# Patient Record
Sex: MALE | Race: WHITE | NOT HISPANIC OR LATINO | Employment: OTHER | URBAN - METROPOLITAN AREA
[De-identification: names, ages, dates, MRNs, and addresses within clinical notes are randomized per-mention and may not be internally consistent; named-entity substitution may affect disease eponyms.]

---

## 2018-05-14 ENCOUNTER — HOSPITAL ENCOUNTER (INPATIENT)
Facility: HOSPITAL | Age: 58
LOS: 5 days | Discharge: HOME | DRG: 190 | End: 2018-05-19
Attending: EMERGENCY MEDICINE | Admitting: HOSPITALIST
Payer: MEDICARE

## 2018-05-14 DIAGNOSIS — J44.9 COPD (CHRONIC OBSTRUCTIVE PULMONARY DISEASE): ICD-10-CM

## 2018-05-14 DIAGNOSIS — N28.9 RENAL INSUFFICIENCY: Primary | ICD-10-CM

## 2018-05-14 DIAGNOSIS — R06.02 SOB (SHORTNESS OF BREATH): ICD-10-CM

## 2018-05-14 LAB
ALBUMIN SERPL BCP-MCNC: 3 G/DL
ALP SERPL-CCNC: 209 U/L
ALT SERPL W/O P-5'-P-CCNC: 67 U/L
ANION GAP SERPL CALC-SCNC: 16 MMOL/L
AST SERPL-CCNC: 55 U/L
B-OH-BUTYR BLD STRIP-SCNC: 1 MMOL/L
BACTERIA #/AREA URNS HPF: ABNORMAL /HPF
BASOPHILS # BLD AUTO: 0.03 K/UL
BASOPHILS NFR BLD: 0.3 %
BILIRUB SERPL-MCNC: 0.6 MG/DL
BILIRUB UR QL STRIP: NEGATIVE
BUN SERPL-MCNC: 27 MG/DL
CALCIUM SERPL-MCNC: 7.9 MG/DL
CHLORIDE SERPL-SCNC: 98 MMOL/L
CLARITY UR: CLEAR
CO2 SERPL-SCNC: 21 MMOL/L
COLOR UR: YELLOW
CREAT SERPL-MCNC: 2 MG/DL
DIFFERENTIAL METHOD: ABNORMAL
EOSINOPHIL # BLD AUTO: 0.2 K/UL
EOSINOPHIL NFR BLD: 2.1 %
ERYTHROCYTE [DISTWIDTH] IN BLOOD BY AUTOMATED COUNT: 16.2 %
EST. GFR  (AFRICAN AMERICAN): 42 ML/MIN/1.73 M^2
EST. GFR  (NON AFRICAN AMERICAN): 36 ML/MIN/1.73 M^2
GLUCOSE SERPL-MCNC: 545 MG/DL
GLUCOSE UR QL STRIP: ABNORMAL
GRAN CASTS #/AREA URNS LPF: 2 /LPF
HCT VFR BLD AUTO: 33.5 %
HGB BLD-MCNC: 11 G/DL
HGB UR QL STRIP: ABNORMAL
HYALINE CASTS #/AREA URNS LPF: 2 /LPF
KETONES UR QL STRIP: ABNORMAL
LACTATE SERPL-SCNC: 3.8 MMOL/L
LEUKOCYTE ESTERASE UR QL STRIP: NEGATIVE
LYMPHOCYTES # BLD AUTO: 1.4 K/UL
LYMPHOCYTES NFR BLD: 15.1 %
MCH RBC QN AUTO: 28.6 PG
MCHC RBC AUTO-ENTMCNC: 32.8 G/DL
MCV RBC AUTO: 87 FL
MICROSCOPIC COMMENT: ABNORMAL
MONOCYTES # BLD AUTO: 0.7 K/UL
MONOCYTES NFR BLD: 7.6 %
NEUTROPHILS # BLD AUTO: 6.9 K/UL
NEUTROPHILS NFR BLD: 73.7 %
NITRITE UR QL STRIP: NEGATIVE
PH UR STRIP: 5 [PH] (ref 5–8)
PLATELET # BLD AUTO: 326 K/UL
PMV BLD AUTO: 9.4 FL
POCT GLUCOSE: 405 MG/DL (ref 70–110)
POTASSIUM SERPL-SCNC: 4.4 MMOL/L
PROT SERPL-MCNC: 6.2 G/DL
PROT UR QL STRIP: NEGATIVE
RBC # BLD AUTO: 3.84 M/UL
RBC #/AREA URNS HPF: 3 /HPF (ref 0–4)
SODIUM SERPL-SCNC: 135 MMOL/L
SP GR UR STRIP: 1.02 (ref 1–1.03)
URN SPEC COLLECT METH UR: ABNORMAL
UROBILINOGEN UR STRIP-ACNC: NEGATIVE EU/DL
WBC # BLD AUTO: 9.31 K/UL
YEAST URNS QL MICRO: ABNORMAL

## 2018-05-14 PROCEDURE — 85025 COMPLETE CBC W/AUTO DIFF WBC: CPT

## 2018-05-14 PROCEDURE — 83605 ASSAY OF LACTIC ACID: CPT

## 2018-05-14 PROCEDURE — 82962 GLUCOSE BLOOD TEST: CPT

## 2018-05-14 PROCEDURE — 99285 EMERGENCY DEPT VISIT HI MDM: CPT

## 2018-05-14 PROCEDURE — 96365 THER/PROPH/DIAG IV INF INIT: CPT

## 2018-05-14 PROCEDURE — 25000242 PHARM REV CODE 250 ALT 637 W/ HCPCS: Performed by: EMERGENCY MEDICINE

## 2018-05-14 PROCEDURE — 27000221 HC OXYGEN, UP TO 24 HOURS

## 2018-05-14 PROCEDURE — 94640 AIRWAY INHALATION TREATMENT: CPT

## 2018-05-14 PROCEDURE — 25000003 PHARM REV CODE 250: Performed by: PHYSICIAN ASSISTANT

## 2018-05-14 PROCEDURE — 87040 BLOOD CULTURE FOR BACTERIA: CPT

## 2018-05-14 PROCEDURE — 96376 TX/PRO/DX INJ SAME DRUG ADON: CPT

## 2018-05-14 PROCEDURE — 82010 KETONE BODYS QUAN: CPT

## 2018-05-14 PROCEDURE — 93010 ELECTROCARDIOGRAM REPORT: CPT | Mod: ,,, | Performed by: INTERNAL MEDICINE

## 2018-05-14 PROCEDURE — 96367 TX/PROPH/DG ADDL SEQ IV INF: CPT

## 2018-05-14 PROCEDURE — 96374 THER/PROPH/DIAG INJ IV PUSH: CPT | Mod: 59

## 2018-05-14 PROCEDURE — 96375 TX/PRO/DX INJ NEW DRUG ADDON: CPT

## 2018-05-14 PROCEDURE — 80053 COMPREHEN METABOLIC PANEL: CPT

## 2018-05-14 PROCEDURE — 12000002 HC ACUTE/MED SURGE SEMI-PRIVATE ROOM

## 2018-05-14 PROCEDURE — 81000 URINALYSIS NONAUTO W/SCOPE: CPT

## 2018-05-14 RX ORDER — CEFTRIAXONE 2 G/50ML
2 INJECTION, SOLUTION INTRAVENOUS
Status: COMPLETED | OUTPATIENT
Start: 2018-05-14 | End: 2018-05-15

## 2018-05-14 RX ORDER — IPRATROPIUM BROMIDE AND ALBUTEROL SULFATE 2.5; .5 MG/3ML; MG/3ML
3 SOLUTION RESPIRATORY (INHALATION)
Status: COMPLETED | OUTPATIENT
Start: 2018-05-14 | End: 2018-05-14

## 2018-05-14 RX ORDER — IPRATROPIUM BROMIDE AND ALBUTEROL SULFATE 2.5; .5 MG/3ML; MG/3ML
3 SOLUTION RESPIRATORY (INHALATION)
Status: COMPLETED | OUTPATIENT
Start: 2018-05-14 | End: 2018-05-15

## 2018-05-14 RX ADMIN — IPRATROPIUM BROMIDE AND ALBUTEROL SULFATE 3 ML: .5; 2.5 SOLUTION RESPIRATORY (INHALATION) at 10:05

## 2018-05-14 RX ADMIN — SODIUM CHLORIDE 1000 ML: 0.9 INJECTION, SOLUTION INTRAVENOUS at 10:05

## 2018-05-15 PROBLEM — J96.02 ACUTE RESPIRATORY FAILURE WITH HYPOXIA AND HYPERCAPNIA: Status: ACTIVE | Noted: 2018-05-15

## 2018-05-15 PROBLEM — E11.65 TYPE 2 DIABETES MELLITUS WITH HYPERGLYCEMIA, WITHOUT LONG-TERM CURRENT USE OF INSULIN: Status: ACTIVE | Noted: 2018-05-15

## 2018-05-15 PROBLEM — J96.01 ACUTE RESPIRATORY FAILURE WITH HYPOXIA AND HYPERCAPNIA: Status: ACTIVE | Noted: 2018-05-15

## 2018-05-15 PROBLEM — N28.9 RENAL INSUFFICIENCY: Status: ACTIVE | Noted: 2018-05-15

## 2018-05-15 PROBLEM — E44.0 MALNUTRITION OF MODERATE DEGREE: Status: ACTIVE | Noted: 2018-05-15

## 2018-05-15 PROBLEM — R25.2 LEG CRAMPING: Status: ACTIVE | Noted: 2018-05-15

## 2018-05-15 PROBLEM — D64.9 ANEMIA: Status: ACTIVE | Noted: 2018-05-15

## 2018-05-15 PROBLEM — J44.1 COPD EXACERBATION: Status: ACTIVE | Noted: 2018-05-15

## 2018-05-15 PROBLEM — R06.02 SOB (SHORTNESS OF BREATH): Status: ACTIVE | Noted: 2018-05-15

## 2018-05-15 PROBLEM — N17.9 AKI (ACUTE KIDNEY INJURY): Status: ACTIVE | Noted: 2018-05-15

## 2018-05-15 LAB
ALLENS TEST: ABNORMAL
ANION GAP SERPL CALC-SCNC: 15 MMOL/L
BUN SERPL-MCNC: 21 MG/DL
CALCIUM SERPL-MCNC: 7.7 MG/DL
CHLORIDE SERPL-SCNC: 107 MMOL/L
CO2 SERPL-SCNC: 20 MMOL/L
CREAT SERPL-MCNC: 1.5 MG/DL
DELSYS: ABNORMAL
EP: 5
ERYTHROCYTE [SEDIMENTATION RATE] IN BLOOD BY WESTERGREN METHOD: 12 MM/H
ERYTHROCYTE [SEDIMENTATION RATE] IN BLOOD BY WESTERGREN METHOD: 2 MM/H
EST. GFR  (AFRICAN AMERICAN): 60 ML/MIN/1.73 M^2
EST. GFR  (NON AFRICAN AMERICAN): 52 ML/MIN/1.73 M^2
ESTIMATED AVG GLUCOSE: 272 MG/DL
FIO2: 100
FIO2: 50
GLUCOSE SERPL-MCNC: 242 MG/DL
HBA1C MFR BLD HPLC: 11.1 %
HCO3 UR-SCNC: 18.8 MMOL/L (ref 24–28)
HCO3 UR-SCNC: 20.9 MMOL/L (ref 24–28)
HCO3 UR-SCNC: 29.6 MMOL/L (ref 24–28)
IP: 10
LACTATE SERPL-SCNC: 6.4 MMOL/L
MAGNESIUM SERPL-MCNC: 1.6 MG/DL
MODE: ABNORMAL
PCO2 BLDA: 32.7 MMHG (ref 35–45)
PCO2 BLDA: 44.1 MMHG (ref 35–45)
PCO2 BLDA: 52.1 MMHG (ref 35–45)
PH SMN: 7.21 [PH] (ref 7.35–7.45)
PH SMN: 7.37 [PH] (ref 7.35–7.45)
PH SMN: 7.43 [PH] (ref 7.35–7.45)
PO2 BLDA: 110 MMHG (ref 80–100)
PO2 BLDA: 227 MMHG (ref 80–100)
PO2 BLDA: 607 MMHG (ref 80–100)
POC BE: -6 MMOL/L
POC BE: -7 MMOL/L
POC BE: 5 MMOL/L
POC SATURATED O2: 100 % (ref 95–100)
POC SATURATED O2: 100 % (ref 95–100)
POC SATURATED O2: 98 % (ref 95–100)
POC TCO2: 20 MMOL/L (ref 23–27)
POC TCO2: 22 MMOL/L (ref 23–27)
POC TCO2: 31 MMOL/L (ref 23–27)
POCT GLUCOSE: 124 MG/DL (ref 70–110)
POCT GLUCOSE: 135 MG/DL (ref 70–110)
POCT GLUCOSE: 210 MG/DL (ref 70–110)
POCT GLUCOSE: 330 MG/DL (ref 70–110)
POCT GLUCOSE: 346 MG/DL (ref 70–110)
POCT GLUCOSE: 363 MG/DL (ref 70–110)
POCT GLUCOSE: 383 MG/DL (ref 70–110)
POCT GLUCOSE: 434 MG/DL (ref 70–110)
POCT GLUCOSE: 474 MG/DL (ref 70–110)
POTASSIUM SERPL-SCNC: 4 MMOL/L
SAMPLE: ABNORMAL
SITE: ABNORMAL
SODIUM SERPL-SCNC: 142 MMOL/L
SP02: 100

## 2018-05-15 PROCEDURE — 94660 CPAP INITIATION&MGMT: CPT

## 2018-05-15 PROCEDURE — 94644 CONT INHLJ TX 1ST HOUR: CPT

## 2018-05-15 PROCEDURE — G8979 MOBILITY GOAL STATUS: HCPCS | Mod: CH

## 2018-05-15 PROCEDURE — 82803 BLOOD GASES ANY COMBINATION: CPT

## 2018-05-15 PROCEDURE — 25000242 PHARM REV CODE 250 ALT 637 W/ HCPCS: Performed by: HOSPITALIST

## 2018-05-15 PROCEDURE — 99900035 HC TECH TIME PER 15 MIN (STAT)

## 2018-05-15 PROCEDURE — 80048 BASIC METABOLIC PNL TOTAL CA: CPT

## 2018-05-15 PROCEDURE — G8978 MOBILITY CURRENT STATUS: HCPCS | Mod: CH

## 2018-05-15 PROCEDURE — G8980 MOBILITY D/C STATUS: HCPCS | Mod: CH

## 2018-05-15 PROCEDURE — 25000003 PHARM REV CODE 250: Performed by: EMERGENCY MEDICINE

## 2018-05-15 PROCEDURE — 83036 HEMOGLOBIN GLYCOSYLATED A1C: CPT

## 2018-05-15 PROCEDURE — 63600175 PHARM REV CODE 636 W HCPCS: Performed by: HOSPITALIST

## 2018-05-15 PROCEDURE — 94640 AIRWAY INHALATION TREATMENT: CPT

## 2018-05-15 PROCEDURE — 63600175 PHARM REV CODE 636 W HCPCS: Performed by: EMERGENCY MEDICINE

## 2018-05-15 PROCEDURE — 25000003 PHARM REV CODE 250: Performed by: HOSPITALIST

## 2018-05-15 PROCEDURE — 21400001 HC TELEMETRY ROOM

## 2018-05-15 PROCEDURE — 25000242 PHARM REV CODE 250 ALT 637 W/ HCPCS: Performed by: EMERGENCY MEDICINE

## 2018-05-15 PROCEDURE — 83735 ASSAY OF MAGNESIUM: CPT

## 2018-05-15 PROCEDURE — 27100107 HC POCKET PEAK FLOW METER

## 2018-05-15 PROCEDURE — 94761 N-INVAS EAR/PLS OXIMETRY MLT: CPT

## 2018-05-15 PROCEDURE — 36600 WITHDRAWAL OF ARTERIAL BLOOD: CPT

## 2018-05-15 PROCEDURE — 83605 ASSAY OF LACTIC ACID: CPT

## 2018-05-15 PROCEDURE — 97161 PT EVAL LOW COMPLEX 20 MIN: CPT

## 2018-05-15 RX ORDER — IBUPROFEN 200 MG
24 TABLET ORAL
Status: DISCONTINUED | OUTPATIENT
Start: 2018-05-15 | End: 2018-05-19 | Stop reason: HOSPADM

## 2018-05-15 RX ORDER — ETOMIDATE 2 MG/ML
INJECTION INTRAVENOUS
Status: DISCONTINUED
Start: 2018-05-15 | End: 2018-05-15 | Stop reason: WASHOUT

## 2018-05-15 RX ORDER — FLUTICASONE FUROATE AND VILANTEROL 100; 25 UG/1; UG/1
1 POWDER RESPIRATORY (INHALATION) DAILY
Status: DISCONTINUED | OUTPATIENT
Start: 2018-05-15 | End: 2018-05-19 | Stop reason: HOSPADM

## 2018-05-15 RX ORDER — CYPROHEPTADINE HYDROCHLORIDE 4 MG/1
4 TABLET ORAL 3 TIMES DAILY
Status: DISCONTINUED | OUTPATIENT
Start: 2018-05-15 | End: 2018-05-19 | Stop reason: HOSPADM

## 2018-05-15 RX ORDER — ACETAMINOPHEN 325 MG/1
650 TABLET ORAL EVERY 6 HOURS PRN
Status: DISCONTINUED | OUTPATIENT
Start: 2018-05-15 | End: 2018-05-19 | Stop reason: HOSPADM

## 2018-05-15 RX ORDER — METHYLPREDNISOLONE SOD SUCC 125 MG
80 VIAL (EA) INJECTION EVERY 8 HOURS
Status: DISCONTINUED | OUTPATIENT
Start: 2018-05-15 | End: 2018-05-16

## 2018-05-15 RX ORDER — ETOMIDATE 2 MG/ML
20 INJECTION INTRAVENOUS
Status: DISCONTINUED | OUTPATIENT
Start: 2018-05-15 | End: 2018-05-15

## 2018-05-15 RX ORDER — ROCURONIUM BROMIDE 10 MG/ML
INJECTION, SOLUTION INTRAVENOUS
Status: DISCONTINUED
Start: 2018-05-15 | End: 2018-05-15 | Stop reason: WASHOUT

## 2018-05-15 RX ORDER — ALBUTEROL SULFATE 2.5 MG/.5ML
5 SOLUTION RESPIRATORY (INHALATION)
Status: COMPLETED | OUTPATIENT
Start: 2018-05-15 | End: 2018-05-15

## 2018-05-15 RX ORDER — INSULIN ASPART 100 [IU]/ML
0-5 INJECTION, SOLUTION INTRAVENOUS; SUBCUTANEOUS
Status: DISCONTINUED | OUTPATIENT
Start: 2018-05-15 | End: 2018-05-19 | Stop reason: HOSPADM

## 2018-05-15 RX ORDER — IBUPROFEN 200 MG
16 TABLET ORAL
Status: DISCONTINUED | OUTPATIENT
Start: 2018-05-15 | End: 2018-05-19 | Stop reason: HOSPADM

## 2018-05-15 RX ORDER — INSULIN ASPART 100 [IU]/ML
4 INJECTION, SOLUTION INTRAVENOUS; SUBCUTANEOUS
Status: DISCONTINUED | OUTPATIENT
Start: 2018-05-15 | End: 2018-05-15

## 2018-05-15 RX ORDER — GLUCAGON 1 MG
1 KIT INJECTION
Status: DISCONTINUED | OUTPATIENT
Start: 2018-05-15 | End: 2018-05-19 | Stop reason: HOSPADM

## 2018-05-15 RX ORDER — ETOMIDATE 2 MG/ML
INJECTION INTRAVENOUS
Status: DISPENSED
Start: 2018-05-15 | End: 2018-05-15

## 2018-05-15 RX ORDER — ALBUTEROL SULFATE 2.5 MG/.5ML
15 SOLUTION RESPIRATORY (INHALATION)
Status: COMPLETED | OUTPATIENT
Start: 2018-05-15 | End: 2018-05-15

## 2018-05-15 RX ORDER — LORAZEPAM 0.5 MG/1
1 TABLET ORAL
Status: DISCONTINUED | OUTPATIENT
Start: 2018-05-15 | End: 2018-05-15

## 2018-05-15 RX ORDER — NITROGLYCERIN 0.4 MG/1
0.4 TABLET SUBLINGUAL
Status: COMPLETED | OUTPATIENT
Start: 2018-05-15 | End: 2018-05-15

## 2018-05-15 RX ORDER — INSULIN ASPART 100 [IU]/ML
3 INJECTION, SOLUTION INTRAVENOUS; SUBCUTANEOUS
Status: DISCONTINUED | OUTPATIENT
Start: 2018-05-15 | End: 2018-05-17

## 2018-05-15 RX ORDER — MAGNESIUM SULFATE HEPTAHYDRATE 40 MG/ML
2 INJECTION, SOLUTION INTRAVENOUS ONCE
Status: COMPLETED | OUTPATIENT
Start: 2018-05-15 | End: 2018-05-15

## 2018-05-15 RX ORDER — HYDROXYZINE HYDROCHLORIDE 25 MG/1
25 TABLET, FILM COATED ORAL ONCE
Status: COMPLETED | OUTPATIENT
Start: 2018-05-15 | End: 2018-05-15

## 2018-05-15 RX ORDER — FLUTICASONE PROPIONATE 50 MCG
1 SPRAY, SUSPENSION (ML) NASAL DAILY
Status: DISCONTINUED | OUTPATIENT
Start: 2018-05-15 | End: 2018-05-16

## 2018-05-15 RX ORDER — LORAZEPAM 2 MG/ML
1 INJECTION INTRAMUSCULAR
Status: COMPLETED | OUTPATIENT
Start: 2018-05-15 | End: 2018-05-15

## 2018-05-15 RX ORDER — ROCURONIUM BROMIDE 10 MG/ML
INJECTION, SOLUTION INTRAVENOUS
Status: DISPENSED
Start: 2018-05-15 | End: 2018-05-15

## 2018-05-15 RX ORDER — SUCCINYLCHOLINE CHLORIDE 20 MG/ML
INJECTION INTRAMUSCULAR; INTRAVENOUS
Status: DISCONTINUED
Start: 2018-05-15 | End: 2018-05-15 | Stop reason: WASHOUT

## 2018-05-15 RX ORDER — DIPHENHYDRAMINE HYDROCHLORIDE 50 MG/ML
25 INJECTION INTRAMUSCULAR; INTRAVENOUS
Status: COMPLETED | OUTPATIENT
Start: 2018-05-15 | End: 2018-05-15

## 2018-05-15 RX ORDER — INSULIN ASPART 100 [IU]/ML
10 INJECTION, SOLUTION INTRAVENOUS; SUBCUTANEOUS ONCE
Status: COMPLETED | OUTPATIENT
Start: 2018-05-15 | End: 2018-05-15

## 2018-05-15 RX ORDER — HYDROXYZINE PAMOATE 25 MG/1
25 CAPSULE ORAL NIGHTLY PRN
Status: DISCONTINUED | OUTPATIENT
Start: 2018-05-15 | End: 2018-05-19 | Stop reason: HOSPADM

## 2018-05-15 RX ORDER — ROCURONIUM BROMIDE 10 MG/ML
0.1 INJECTION, SOLUTION INTRAVENOUS ONCE
Status: DISCONTINUED | OUTPATIENT
Start: 2018-05-15 | End: 2018-05-15

## 2018-05-15 RX ORDER — IPRATROPIUM BROMIDE AND ALBUTEROL SULFATE 2.5; .5 MG/3ML; MG/3ML
3 SOLUTION RESPIRATORY (INHALATION) EVERY 4 HOURS
Status: DISCONTINUED | OUTPATIENT
Start: 2018-05-15 | End: 2018-05-16

## 2018-05-15 RX ORDER — FUROSEMIDE 10 MG/ML
40 INJECTION INTRAMUSCULAR; INTRAVENOUS
Status: COMPLETED | OUTPATIENT
Start: 2018-05-15 | End: 2018-05-15

## 2018-05-15 RX ADMIN — SODIUM CHLORIDE 1524 ML: 0.9 INJECTION, SOLUTION INTRAVENOUS at 12:05

## 2018-05-15 RX ADMIN — IPRATROPIUM BROMIDE AND ALBUTEROL SULFATE 3 ML: .5; 2.5 SOLUTION RESPIRATORY (INHALATION) at 08:05

## 2018-05-15 RX ADMIN — INSULIN DETEMIR 8 UNITS: 100 INJECTION, SOLUTION SUBCUTANEOUS at 08:05

## 2018-05-15 RX ADMIN — INSULIN HUMAN 5 UNITS: 100 INJECTION, SOLUTION PARENTERAL at 12:05

## 2018-05-15 RX ADMIN — FUROSEMIDE 40 MG: 10 INJECTION, SOLUTION INTRAMUSCULAR; INTRAVENOUS at 01:05

## 2018-05-15 RX ADMIN — INSULIN ASPART 4 UNITS: 100 INJECTION, SOLUTION INTRAVENOUS; SUBCUTANEOUS at 11:05

## 2018-05-15 RX ADMIN — LORAZEPAM 1 MG: 2 INJECTION, SOLUTION INTRAMUSCULAR; INTRAVENOUS at 04:05

## 2018-05-15 RX ADMIN — AZITHROMYCIN MONOHYDRATE 500 MG: 500 INJECTION, POWDER, LYOPHILIZED, FOR SOLUTION INTRAVENOUS at 12:05

## 2018-05-15 RX ADMIN — INSULIN HUMAN 5 UNITS: 100 INJECTION, SOLUTION PARENTERAL at 04:05

## 2018-05-15 RX ADMIN — IPRATROPIUM BROMIDE AND ALBUTEROL SULFATE 3 ML: .5; 2.5 SOLUTION RESPIRATORY (INHALATION) at 11:05

## 2018-05-15 RX ADMIN — INSULIN ASPART 4 UNITS: 100 INJECTION, SOLUTION INTRAVENOUS; SUBCUTANEOUS at 04:05

## 2018-05-15 RX ADMIN — CEFTRIAXONE 2 G: 2 INJECTION, SOLUTION INTRAVENOUS at 03:05

## 2018-05-15 RX ADMIN — HYDROXYZINE HYDROCHLORIDE 25 MG: 25 TABLET, FILM COATED ORAL at 12:05

## 2018-05-15 RX ADMIN — IPRATROPIUM BROMIDE AND ALBUTEROL SULFATE 3 ML: .5; 2.5 SOLUTION RESPIRATORY (INHALATION) at 07:05

## 2018-05-15 RX ADMIN — ALBUTEROL SULFATE 15 MG: 2.5 SOLUTION RESPIRATORY (INHALATION) at 05:05

## 2018-05-15 RX ADMIN — ALBUTEROL SULFATE 5 MG: 2.5 SOLUTION RESPIRATORY (INHALATION) at 02:05

## 2018-05-15 RX ADMIN — CYPROHEPTADINE HYDROCHLORIDE 4 MG: 4 TABLET ORAL at 02:05

## 2018-05-15 RX ADMIN — INSULIN ASPART 3 UNITS: 100 INJECTION, SOLUTION INTRAVENOUS; SUBCUTANEOUS at 04:05

## 2018-05-15 RX ADMIN — FLUTICASONE PROPIONATE 50 MCG: 50 SPRAY, METERED NASAL at 10:05

## 2018-05-15 RX ADMIN — NITROGLYCERIN 0.4 MG: 0.4 TABLET SUBLINGUAL at 01:05

## 2018-05-15 RX ADMIN — METHYLPREDNISOLONE SODIUM SUCCINATE 80 MG: 125 INJECTION, POWDER, FOR SOLUTION INTRAMUSCULAR; INTRAVENOUS at 07:05

## 2018-05-15 RX ADMIN — INSULIN ASPART 3 UNITS: 100 INJECTION, SOLUTION INTRAVENOUS; SUBCUTANEOUS at 12:05

## 2018-05-15 RX ADMIN — FLUTICASONE FUROATE AND VILANTEROL TRIFENATATE 1 PUFF: 100; 25 POWDER RESPIRATORY (INHALATION) at 11:05

## 2018-05-15 RX ADMIN — CYPROHEPTADINE HYDROCHLORIDE 4 MG: 4 TABLET ORAL at 09:05

## 2018-05-15 RX ADMIN — IPRATROPIUM BROMIDE AND ALBUTEROL SULFATE 3 ML: .5; 2.5 SOLUTION RESPIRATORY (INHALATION) at 12:05

## 2018-05-15 RX ADMIN — METHYLPREDNISOLONE SODIUM SUCCINATE 80 MG: 125 INJECTION, POWDER, FOR SOLUTION INTRAMUSCULAR; INTRAVENOUS at 09:05

## 2018-05-15 RX ADMIN — INSULIN ASPART 10 UNITS: 100 INJECTION, SOLUTION INTRAVENOUS; SUBCUTANEOUS at 10:05

## 2018-05-15 RX ADMIN — MAGNESIUM SULFATE IN WATER 2 G: 40 INJECTION, SOLUTION INTRAVENOUS at 12:05

## 2018-05-15 RX ADMIN — SODIUM CHLORIDE 500 ML: 0.9 INJECTION, SOLUTION INTRAVENOUS at 02:05

## 2018-05-15 RX ADMIN — METHYLPREDNISOLONE SODIUM SUCCINATE 80 MG: 125 INJECTION, POWDER, FOR SOLUTION INTRAMUSCULAR; INTRAVENOUS at 01:05

## 2018-05-15 RX ADMIN — DIPHENHYDRAMINE HYDROCHLORIDE 25 MG: 50 INJECTION, SOLUTION INTRAMUSCULAR; INTRAVENOUS at 05:05

## 2018-05-15 NOTE — PLAN OF CARE
Problem: Patient Care Overview  Goal: Plan of Care Review  Outcome: Ongoing (interventions implemented as appropriate)  Patient admitted to ICU overnight for COPD exacerbation. Patient tolerated 2LNC majority of the day and is now 96% on room air. AAOx3. Vitals stable. ST on cardiac monitor. Denies shortness of breath and chest pain. Voids spontaneously. 500ml NS bolus given. Transferred to Telemetry with RN and transporter with patient belongings. Denies complaints. Oriented to new room. Bedside report given with KAYE Easley.

## 2018-05-15 NOTE — ASSESSMENT & PLAN NOTE
"- presented with Cr 2  - unknown baseline, has diagnosis of "renal insufficiency" but unknown CKD stage. Last Cr in system is 1  - improved to 1.5 with IVF, suggesting prerenal etiology  - continue to monitor  - renal dose all meds, avoid nephrotoxins     "

## 2018-05-15 NOTE — ASSESSMENT & PLAN NOTE
- as evidenced by loss of subQ fat and muscle wasting as well as falls at home. Patient also on cyproheptadine as outpatient   - increase diet to 2000 samm

## 2018-05-15 NOTE — PLAN OF CARE
Mr. Basilio Márquez is a 57 y.o. man with COPD, history of Hodgkin's lymphoma in his 20s, DM who presented with COPD exacerbation. Received steroids, nebs, antibiotics in ED. Admitted to ICU requiring continuous BiPAP. Weaned easily to NC 5/15 AM. Now on 1L NC with SpO2 upper 90s. Still wheezing. Step down to floor for COPD optimization. Note patient has chronic lung changes with R sided scarring due to Hodgkin's lymphoma.     To do  - wean O2  - PT/OT eval- patient with recent falls at home    Vidya Calle MD  [facility]  05/15/2018 1:41 PM

## 2018-05-15 NOTE — ED PROVIDER NOTES
Encounter Date: 5/14/2018    SCRIBE #1 NOTE: I, Ileana George, am scribing for, and in the presence of,  Azar Durham MD. I have scribed the following portions of the note - Other sections scribed: HPI, ROS, PE.       History     Chief Complaint   Patient presents with    Shortness of Breath     since this morning     Hyperglycemia     cbg of 473, hx of diabetes, increased thrist and urination      CC: Shortness of Breath    HPI: This 57 y.o make who reports of asthma and COPD presents to the ED for an evaluation of acute, constant, severe shortness of breath for the past several days.  Patient also reports of associated productive cough and fever.  Patient reports for the past 5 years he has been having increased episodes of shortness of breath.  Patient reports the use of a home nebulizer.  Patient reports being evaluated at [facility] 1 week ago for similar symptoms and reports being placed on oral steroids, which he completed 2-3 days ago.  Patient denies nausea, emesis, diarrhea, abdominal pain, chest pain, back pain, leg swelling, or any other associated symptoms. No alleviating factors.      The history is provided by the patient. No  was used.     Review of patient's allergies indicates:  No Known Allergies  No past medical history on file.  No past surgical history on file.  No family history on file.  Social History   Substance Use Topics    Smoking status: Current Every Day Smoker    Smokeless tobacco: Not on file    Alcohol use No     Review of Systems   Constitutional: Positive for fever. Negative for chills.   HENT: Negative for ear pain and sore throat.    Eyes: Negative for pain.   Respiratory: Positive for cough and shortness of breath.    Cardiovascular: Negative for chest pain.   Gastrointestinal: Negative for abdominal pain, diarrhea, nausea and vomiting.   Genitourinary: Negative for dysuria.   Musculoskeletal: Negative for back pain.   Skin: Negative for rash.    Neurological: Negative for headaches.       Physical Exam     Initial Vitals [05/14/18 2114]   BP Pulse Resp Temp SpO2   134/85 (!) 130 20 98.1 °F (36.7 °C) 99 %      MAP       101.33         Physical Exam    Nursing note and vitals reviewed.  Constitutional: Vital signs are normal. He appears well-developed and well-nourished. He is active.  Non-toxic appearance. No distress.   HENT:   Head: Normocephalic and atraumatic.   Mouth/Throat: Dental caries (nacrotic) present.   Eyes: EOM are normal.   Neck: Trachea normal. Neck supple.   Cardiovascular: Normal rate and regular rhythm.   Pulmonary/Chest: No respiratory distress.   Patient has bilateral course wheezes.    Abdominal: Soft. Normal appearance and bowel sounds are normal. He exhibits no distension. There is no tenderness.   Musculoskeletal: Normal range of motion. He exhibits no edema.   Neurological: He is alert.   Skin: Skin is warm, dry and intact.   Psychiatric: He has a normal mood and affect.         ED Course   Critical Care  Date/Time: 5/30/2018 9:13 AM  Performed by: YOLIS MIMS  Authorized by: RAFAELA COREA   Direct patient critical care time: 15 minutes  Ordering / reviewing critical care time: 5 minutes  Documentation critical care time: 5 minutes  Consulting other physicians critical care time: 5 minutes  Total critical care time (exclusive of procedural time) : 30 minutes  Critical care time was exclusive of separately billable procedures and treating other patients and teaching time.  Critical care was necessary to treat or prevent imminent or life-threatening deterioration of the following conditions: respiratory failure.  Critical care was time spent personally by me on the following activities: discussions with consultants, evaluation of patient's response to treatment, obtaining history from patient or surrogate, ordering and review of laboratory studies, pulse oximetry, re-evaluation of patient's condition, ordering and  review of radiographic studies, ordering and performing treatments and interventions, ventilator management, examination of patient and interpretation of cardiac output measurements.  Comments: bipap mgmt.         Labs Reviewed   CBC W/ AUTO DIFFERENTIAL   COMPREHENSIVE METABOLIC PANEL   URINALYSIS   BETA - HYDROXYBUTYRATE, SERUM     EKG Readings: (Independently Interpreted)   Initial Reading: No STEMI. Rhythm: Sinus Tachycardia. Heart Rate: 116. Ectopy: No Ectopy. Conduction: Normal. ST Segments: Normal ST Segments. T Waves: Normal. Axis: Normal. Clinical Impression: Normal Sinus Rhythm          Medical Decision Making:   Post 3 nebs still speaking in half sentences with dyspnea. Had recently been on steroids and just came off of them 2 days ago. ? How old cavitary lung lesion is. Pt states he had scarring before. With elevated cr, and fluid admin on possib of pneumonia, pt developed worsening dyspnea. Suspect some iatrogenic vol overload. Sx improved with ntg. Will diurese. Lungs still tight bilaterally with coarse wheezing.             Scribe Attestation:   Scribe #1: I performed the above scribed service and the documentation accurately describes the services I performed. I attest to the accuracy of the note.    Attending Attestation:           Physician Attestation for Scribe:  Physician Attestation Statement for Scribe #1: I, Azar Durham MD, reviewed documentation, as scribed by Ileana George in my presence, and it is both accurate and complete.                    Clinical Impression:   Diagnoses of SOB (shortness of breath) and SOB (shortness of breath) were pertinent to this visit.                           Azar Ramires MD  05/15/18 8505       Azar Ramires MD  05/30/18 9947

## 2018-05-15 NOTE — PROGRESS NOTES
Results for CORRINE PATTERSON (MRN [id number]) as of 5/15/2018 11:39   Ref. Range 5/15/2018 08:41   POC PH Latest Ref Range: 7.35 - 7.45  7.435   POC PCO2 Latest Ref Range: 35 - 45 mmHg 44.1   POC PO2 Latest Ref Range: 80 - 100 mmHg 607 (H)   POC BE Latest Ref Range: -2 to 2 mmol/L 5   POC HCO3 Latest Ref Range: 24 - 28 mmol/L 29.6 (H)   POC SATURATED O2 Latest Ref Range: 95 - 100 % 100   POC TCO2 Latest Ref Range: 23 - 27 mmol/L 31 (H)   FiO2 Unknown 100   Sample Unknown ARTERIAL   DelSys Unknown CPAP/BiPAP   Allens Test Unknown Pass   Site Unknown RR   Mode Unknown SPONT   Sp02 Unknown 100

## 2018-05-15 NOTE — PLAN OF CARE
Problem: Physical Therapy Goal  Goal: Physical Therapy Goal  Outcome: Outcome(s) achieved Date Met: 05/15/18  Pt OK for D/C home from PT standpoint.  No skilled PT or DME needs.

## 2018-05-15 NOTE — H&P
[facility]  History & Physical    Patient Name: Basilio Márquez  MRN: [id number]  Admission Date: 5/14/2018  Attending Physician: Vidya Calle MD   Primary Care Provider: To Obtain Unable         Patient information was obtained from patient, past medical records and ER records.     Subjective:     Principal Problem:<principal problem not specified>    Chief Complaint:   Chief Complaint   Patient presents with    Shortness of Breath     since this morning     Hyperglycemia     cbg of 473, hx of diabetes, increased thrist and urination         HPI: Mr. Basilio Márquez is a 57 y.o. 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 States this started gradually a few days ago with the weather change. Associated with chest congestion and cough with no sputum production. No fevers or chills. Chest pain only with coughing. No sick contacts. Tried his home inhalers and nebulizers with minimal improvement. Presented to ED for worsening shortness of breath.     Past Medical History:   Diagnosis Date    Asthma     Cancer     Hodgkin's lymphoma in his 20s s/p chemotherapy    COPD (chronic obstructive pulmonary disease)     Diabetes mellitus     GERD (gastroesophageal reflux disease)     Hypertension     Renal disorder        History reviewed. No pertinent surgical history.    Review of patient's allergies indicates:  No Known Allergies    No current facility-administered medications on file prior to encounter.      Current Outpatient Prescriptions on File Prior to Encounter   Medication Sig    albuterol 90 mcg/actuation inhaler Inhale 2 puffs into the lungs every 6 (six) hours as needed for Wheezing or Shortness of Breath.    budesonide-formoterol 160-4.5 mcg (SYMBICORT) 160-4.5 mcg/actuation HFAA Inhale 2 puffs into the lungs every 12 (twelve) hours.    cyproheptadine (PERIACTIN) 4 mg tablet Take 1 tablet (4 mg  total) by mouth 3 (three) times daily.    doxycycline (MONODOX) 100 MG capsule Take 1 capsule (100 mg total) by mouth 2 (two) times daily.    fluticasone (FLONASE) 50 mcg/actuation nasal spray 1-2 sprays by Each Nare route once daily.    hydrOXYzine pamoate (VISTARIL) 25 MG Cap Take 1 capsule (25 mg total) by mouth nightly as needed (insomnia or anxiety).    metformin (GLUCOPHAGE) 1000 MG tablet Take 1 tablet (1,000 mg total) by mouth 2 (two) times daily with meals.    promethazine-dextromethorphan (PROMETHAZINE-DM) 6.25-15 mg/5 mL Syrp 1 teaspoon PO Q 8 hrs PRN cough. DO NOT DRIVE AFTER TAKING MED     Family History     Problem Relation (Age of Onset)    Cancer Father    Ulcers Mother        Social History Main Topics    Smoking status: Former Smoker     Packs/day: 1.00     Years: 30.00     Types: Cigarettes    Smokeless tobacco: Never Used    Alcohol use 0.0 oz/week      Comment: 1/2 pint daily    Drug use: No    Sexual activity: Not on file     Review of Systems   Constitutional: Negative for appetite change, chills, diaphoresis, fatigue and fever.   HENT: Positive for congestion. Negative for postnasal drip, rhinorrhea, sinus pain, sinus pressure, sore throat and trouble swallowing.    Eyes: Negative for visual disturbance.   Respiratory: Positive for cough, shortness of breath and wheezing. Negative for choking, chest tightness and stridor.    Cardiovascular: Positive for chest pain. Negative for palpitations and leg swelling.   Gastrointestinal: Negative for abdominal distention, abdominal pain, constipation, diarrhea, nausea and vomiting.   Genitourinary: Negative for decreased urine volume, difficulty urinating, dysuria, hematuria and urgency.   Musculoskeletal: Positive for myalgias (leg cramping). Negative for arthralgias and back pain.   Skin: Positive for wound (scabs on legs from prior fall). Negative for pallor and rash.   Neurological: Negative for dizziness, syncope, facial asymmetry,  weakness, light-headedness and numbness.     Objective:     Vital Signs (Most Recent):  Temp: 98.1 °F (36.7 °C) (05/15/18 0645)  Pulse: 109 (05/15/18 0821)  Resp: 18 (05/15/18 0821)  BP: 137/79 (05/15/18 0645)  SpO2: 100 % (05/15/18 0821) Vital Signs (24h Range):  Temp:  [98.1 °F (36.7 °C)-98.6 °F (37 °C)] 98.1 °F (36.7 °C)  Pulse:  [109-130] 109  Resp:  [18-33] 18  SpO2:  [97 %-100 %] 100 %  BP: (121-155)/(66-85) 137/79     Weight: 47.2 kg (104 lb 0.9 oz)  Body mass index is 18.43 kg/m².    Physical Exam   Constitutional: He is oriented to person, place, and time. No distress.   Thin man   HENT:   Head: Normocephalic and atraumatic.   Nose: Nose normal.   Mouth/Throat: Oropharynx is clear and moist.   Eyes: Conjunctivae and EOM are normal. Pupils are equal, round, and reactive to light. No scleral icterus.   Neck: Neck supple. No JVD present. No tracheal deviation present. No thyromegaly present.   Cardiovascular: Intact distal pulses.  Exam reveals no gallop and no friction rub.    No murmur heard.  tachycardic   Pulmonary/Chest: No stridor. No respiratory distress. He has wheezes (throughout). He has no rales. He exhibits no tenderness.   On BiPAP. No accessory muscle use   Abdominal: Soft. Bowel sounds are normal. He exhibits no distension and no mass. There is no tenderness. There is no guarding.   Musculoskeletal: He exhibits no edema or tenderness.   Lymphadenopathy:     He has no cervical adenopathy.   Neurological: He is alert and oriented to person, place, and time. No cranial nerve deficit or sensory deficit.   Skin: Skin is warm and dry. He is not diaphoretic.   Scabs on bilateral lower extremities   Nursing note and vitals reviewed.        CRANIAL NERVES     CN III, IV, VI   Pupils are equal, round, and reactive to light.  Extraocular motions are normal.        Significant Labs: All pertinent labs within the past 24 hours have been reviewed.    Significant Imaging: I have reviewed and interpreted all  "pertinent imaging results/findings within the past 24 hours.    Assessment/Plan:     Malnutrition of moderate degree    - as evidenced by loss of subQ fat and muscle wasting as well as falls at home. Patient also on cyproheptadine as outpatient   - increase diet to 2000 samm          Leg cramping    - check Mg  - PRN APAP        Type 2 diabetes mellitus with hyperglycemia, without long-term current use of insulin    - unknown A1c. On metformin alone at home  - presented with hyperglycemia, improved rapidly with IVF  - check A1c  - ADA diet, accuchecks ACHS, SSI, hypoglycemic protocol  - detemir 8 QD          Anemia    - presents with Hgb 11, normal MCV  - likely anemia of chronic disease  - monitor           COPD exacerbation    - presented with hypoxic and hypercapnic respiratory failure. Patient has background of chronic lung changes due to Hodgkin's lymphoma with CXR showing scarring in R lung. Patient reports weather change as cause of acute exacerbation. Not on O2 at home  - improved with BiPAP  - now weaned to NC- continue to wean to SpO2 goal 88-92%  - BiPAP QHS  - continue nebs Q4. Patient still wheezing bilaterally  - continue solumedrol for now, may be able to wean to prednisone tomorrow pending clinical course  - patient reports that he takes incruse and advair at home. Start fluticasone-vilanterol now          DREW (acute kidney injury)    - presented with Cr 2  - unknown baseline, has diagnosis of "renal insufficiency" but unknown CKD stage. Last Cr in system is 1  - improved to 1.5 with IVF, suggesting prerenal etiology  - continue to monitor  - renal dose all meds, avoid nephrotoxins           Acute respiratory failure with hypoxia and hypercapnia    - secondary to COPD exacerbation  - improved with BiPAP  - weaned to NC  - see COPD exacerbation  - goal SpO2 88-92%            VTE Risk Mitigation         Ordered     IP VTE LOW RISK PATIENT  Once      05/15/18 0655     Place sequential compression device  " Until discontinued      05/15/18 0655        Critical care time spent on the evaluation and treatment of severe organ dysfunction, review of pertinent labs and imaging studies, discussions with consulting providers and discussions with patient/family: 60 minutes.     Vidya Calle MD  Department of Hospital Medicine   [facility]

## 2018-05-15 NOTE — ASSESSMENT & PLAN NOTE
- secondary to COPD exacerbation  - improved with BiPAP  - weaned to NC  - see COPD exacerbation  - goal SpO2 88-92%

## 2018-05-15 NOTE — PT/OT/SLP EVAL
Physical Therapy Evaluation    Patient Name:  Basilio Márquez   MRN:  [id number]    Recommendations:     Discharge Recommendations:  home   Discharge Equipment Recommendations: none   Barriers to discharge: None    Assessment:     Basilio Márquez is a 57 y.o. male admitted with a medical diagnosis of <principal problem not specified>.  He presents with the following impairments/functional limitations:   (N/A) .    Rehab Prognosis:  Good; patient would benefit from acute skilled PT services to address these deficits and reach maximum level of function.      Recent Surgery: * No surgery found *      Plan:     During this hospitalization, patient to be seen  (N/A) to address the above listed problems via  (N/A)  · Plan of Care Expires:  05/15/18   Plan of Care Reviewed with: patient    Subjective     Communicated with nsg prior to session.  Patient found supine I bed upon PT entry to room, agreeable to evaluation.      Chief Complaint: no c/o  Patient comments/goals: to go home  Pain/Comfort:  · Pain Rating 1: 0/10    Patients cultural, spiritual, Mormon conflicts given the current situation: none    Living Environment:  Ptlives alone in a ground floor apartment.  Prior to admission, patients level of function was Independent.  Patient has the following equipment: none.  DME owned (not currently used): none.  Upon discharge, patient will have assistance from ?.    Objective:     Patient found with: peripheral IV     General Precautions: Standard, fall   Orthopedic Precautions:N/A   Braces: N/A     Exams:  · Cognitive Exam:  Patient is oriented to Person, Place, Time and Situation and follows 100% of 1-step commands   · Gross Motor Coordination:  WFL  · Postural Exam:  Patient presented with the following abnormalities:    · -       Rounded shoulders  · -       Forward head  · Sensation:    · -       Intact  light/touch B UE/LE's  · Skin Integrity/Edema:      · -       Skin integrity: small amolunt of bloody drainage  at R hand IV site.  Nsg notified  · RUE ROM: WFL  · RUE Strength: WFL  · LUE ROM: WFL  · LUE Strength: WFL  · RLE ROM: WFL  · RLE Strength: WFL  · LLE ROM: WFL  · LLE Strength: WFL    Functional Mobility:  · Bed Mobility:     · Scooting: independence  · Supine to Sit: independence  · Sit to Supine: independence  · Transfers:     · Sit to Stand:  independence with no AD  · Gait: 250' independent  · Balance: Good    AM-PAC 6 CLICK MOBILITY  Total Score:24       Therapeutic Activities and Exercises:   eval only    Patient left supine with all lines intact, call button in reach, bed alarm on and nsg notified.    GOALS:    Physical Therapy Goals     Not on file          Multidisciplinary Problems (Resolved)        Problem: Physical Therapy Goal    Goal Priority Disciplines Outcome Goal Variances Interventions   Physical Therapy Goal   (Resolved)     PT/OT, PT Outcome(s) achieved                     History:     Past Medical History:   Diagnosis Date    Asthma     Cancer     Hodgkin's lymphoma in his 20s s/p chemotherapy    COPD (chronic obstructive pulmonary disease)     Diabetes mellitus     GERD (gastroesophageal reflux disease)     Hypertension     Renal disorder        History reviewed. No pertinent surgical history.    Clinical Decision Making:     History  Co-morbidities and personal factors that may impact the plan of care Examination  Body Structures and Functions, activity limitations and participation restrictions that may impact the plan of care Clinical Presentation   Decision Making/ Complexity Score   Co-morbidities:   [] Time since onset of injury / illness / exacerbation  [] Status of current condition  []Patient's cognitive status and safety concerns    [] Multiple Medical Problems (see med hx)  Personal Factors:   [] Patient's age  [] Prior Level of function   [] Patient's home situation (environment and family support)  [] Patient's level of motivation  [] Expected progression of  patient      HISTORY:(criteria)    [] 27151 - no personal factors/history    [] 68973 - has 1-2 personal factor/comorbidity     [] 08196 - has >3 personal factor/comorbidity     Body Regions:  [] Objective examination findings  [] Head     []  Neck  [] Trunk   [] Upper Extremity  [] Lower Extremity    Body Systems:  [] For communication ability, affect, cognition, language, and learning style: the assessment of the ability to make needs known, consciousness, orientation (person, place, and time), expected emotional /behavioral responses, and learning preferences (eg, learning barriers, education  needs)  [] For the neuromuscular system: a general assessment of gross coordinated movement (eg, balance, gait, locomotion, transfers, and transitions) and motor function  (motor control and motor learning)  [] For the musculoskeletal system: the assessment of gross symmetry, gross range of motion, gross strength, height, and weight  [] For the integumentary system: the assessment of pliability(texture), presence of scar formation, skin color, and skin integrity  [] For cardiovascular/pulmonary system: the assessment of heart rate, respiratory rate, blood pressure, and edema     Activity limitations:    [] Patient's cognitive status and saf ety concerns          [] Status of current condition      [] Weight bearing restriction  [] Cardiopulmunary Restriction    Participation Restrictions:   [] Goals and goal agreement with the patient     [] Rehab potential (prognosis) and probable outcome      Examination of Body System: (criteria)    [] 05981 - addressing 1-2 elements    [] 45456 - addressing a total of 3 or more elements     [] 52866 -  Addressing a total of 4 or more elements         Clinical Presentation: (criteria)  Choose one     On examination of body system using standardized tests and measures patient presents with (CHOOSE ONE) elements from any of the following: body structures and functions, activity  limitations, and/or participation restrictions.  Leading to a clinical presentation that is considered (CHOOSE ONE)                              Clinical Decision Making  (Eval Complexity):  Choose One     Time Tracking:     PT Received On: 05/15/18  PT Start Time: 1553     PT Stop Time: 1608  PT Total Time (min): 15 min     Billable Minutes: Evaluation 15      Karishma Huitron, PT  05/15/2018

## 2018-05-15 NOTE — HOSPITAL COURSE
Admitted to ICU with hypercapnic respiratory failure due to COPD exacerbation. Started on BiPAP, solumedrol, and nebs with improvement. Given ceftriaxone and azithromycin in ED; no signs of PNA, therefore discontinued. Patient later reported binge drinking often and non compliance with bronchodilators managements. Also exposed to second hand smoking. Weaned to nasal cannula on 5/15 AM and transferred to floor in stable condition. Completed 4 days of steroids. Caused significant rise in BG in setting of poorly controlled diabetes at home (HgbA1c 11.1%). Controlled with detemir 25 U QHS and novolog 8 U with meals. Discussed this regimen with patient as he was only doing novolog as needed at home. Is also to continue metformin. Eventually able to be weaned off completely from supplemental O2 and able to ambulate independently down hallways without respiratory issues. Strongly advised alcohol cessation and exposure to second hand smoke. He requested NH placement to help with medical management at home. Then decided on staying home and ok with outpatient case management. Declined home health. Is to f/u with PCP within next 7 days. Diabetic diet. Activity as tolerated.

## 2018-05-15 NOTE — ASSESSMENT & PLAN NOTE
- unknown A1c. On metformin alone at home  - presented with hyperglycemia, improved rapidly with IVF  - check A1c  - ADA diet, accuchecks ACHS, SSI, hypoglycemic protocol  - detemir 8 QD

## 2018-05-15 NOTE — ASSESSMENT & PLAN NOTE
- presented with hypoxic and hypercapnic respiratory failure. Patient has background of chronic lung changes due to Hodgkin's lymphoma with CXR showing scarring in R lung. Patient reports weather change as cause of acute exacerbation. Not on O2 at home  - improved with BiPAP  - now weaned to NC- continue to wean to SpO2 goal 88-92%  - BiPAP QHS  - continue nebs Q4. Patient still wheezing bilaterally  - continue solumedrol for now, may be able to wean to prednisone tomorrow pending clinical course  - patient reports that he takes incruse and advair at home. Start fluticasone-vilanterol now

## 2018-05-15 NOTE — HPI
Mr. Basilio Márquez is a 57 y.o. 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 States this started gradually a few days ago with the weather change. Associated with chest congestion and cough with no sputum production. No fevers or chills. Chest pain only with coughing. No sick contacts. Tried his home inhalers and nebulizers with minimal improvement. Presented to ED for worsening shortness of breath.

## 2018-05-15 NOTE — SUBJECTIVE & OBJECTIVE
Past Medical History:   Diagnosis Date    Asthma     Cancer     Hodgkin's lymphoma in his 20s s/p chemotherapy    COPD (chronic obstructive pulmonary disease)     Diabetes mellitus     GERD (gastroesophageal reflux disease)     Hypertension     Renal disorder        History reviewed. No pertinent surgical history.    Review of patient's allergies indicates:  No Known Allergies    No current facility-administered medications on file prior to encounter.      Current Outpatient Prescriptions on File Prior to Encounter   Medication Sig    albuterol 90 mcg/actuation inhaler Inhale 2 puffs into the lungs every 6 (six) hours as needed for Wheezing or Shortness of Breath.    budesonide-formoterol 160-4.5 mcg (SYMBICORT) 160-4.5 mcg/actuation HFAA Inhale 2 puffs into the lungs every 12 (twelve) hours.    cyproheptadine (PERIACTIN) 4 mg tablet Take 1 tablet (4 mg total) by mouth 3 (three) times daily.    doxycycline (MONODOX) 100 MG capsule Take 1 capsule (100 mg total) by mouth 2 (two) times daily.    fluticasone (FLONASE) 50 mcg/actuation nasal spray 1-2 sprays by Each Nare route once daily.    hydrOXYzine pamoate (VISTARIL) 25 MG Cap Take 1 capsule (25 mg total) by mouth nightly as needed (insomnia or anxiety).    metformin (GLUCOPHAGE) 1000 MG tablet Take 1 tablet (1,000 mg total) by mouth 2 (two) times daily with meals.    promethazine-dextromethorphan (PROMETHAZINE-DM) 6.25-15 mg/5 mL Syrp 1 teaspoon PO Q 8 hrs PRN cough. DO NOT DRIVE AFTER TAKING MED     Family History     Problem Relation (Age of Onset)    Cancer Father    Ulcers Mother        Social History Main Topics    Smoking status: Former Smoker     Packs/day: 1.00     Years: 30.00     Types: Cigarettes    Smokeless tobacco: Never Used    Alcohol use 0.0 oz/week      Comment: 1/2 pint daily    Drug use: No    Sexual activity: Not on file     Review of Systems   Constitutional: Negative for appetite change, chills, diaphoresis, fatigue and  fever.   HENT: Positive for congestion. Negative for postnasal drip, rhinorrhea, sinus pain, sinus pressure, sore throat and trouble swallowing.    Eyes: Negative for visual disturbance.   Respiratory: Positive for cough, shortness of breath and wheezing. Negative for choking, chest tightness and stridor.    Cardiovascular: Positive for chest pain. Negative for palpitations and leg swelling.   Gastrointestinal: Negative for abdominal distention, abdominal pain, constipation, diarrhea, nausea and vomiting.   Genitourinary: Negative for decreased urine volume, difficulty urinating, dysuria, hematuria and urgency.   Musculoskeletal: Positive for myalgias (leg cramping). Negative for arthralgias and back pain.   Skin: Positive for wound (scabs on legs from prior fall). Negative for pallor and rash.   Neurological: Negative for dizziness, syncope, facial asymmetry, weakness, light-headedness and numbness.     Objective:     Vital Signs (Most Recent):  Temp: 98.1 °F (36.7 °C) (05/15/18 0645)  Pulse: 109 (05/15/18 0821)  Resp: 18 (05/15/18 0821)  BP: 137/79 (05/15/18 0645)  SpO2: 100 % (05/15/18 0821) Vital Signs (24h Range):  Temp:  [98.1 °F (36.7 °C)-98.6 °F (37 °C)] 98.1 °F (36.7 °C)  Pulse:  [109-130] 109  Resp:  [18-33] 18  SpO2:  [97 %-100 %] 100 %  BP: (121-155)/(66-85) 137/79     Weight: 47.2 kg (104 lb 0.9 oz)  Body mass index is 18.43 kg/m².    Physical Exam   Constitutional: He is oriented to person, place, and time. No distress.   Thin man   HENT:   Head: Normocephalic and atraumatic.   Nose: Nose normal.   Mouth/Throat: Oropharynx is clear and moist.   Eyes: Conjunctivae and EOM are normal. Pupils are equal, round, and reactive to light. No scleral icterus.   Neck: Neck supple. No JVD present. No tracheal deviation present. No thyromegaly present.   Cardiovascular: Intact distal pulses.  Exam reveals no gallop and no friction rub.    No murmur heard.  tachycardic   Pulmonary/Chest: No stridor. No respiratory  distress. He has wheezes (throughout). He has no rales. He exhibits no tenderness.   On BiPAP. No accessory muscle use   Abdominal: Soft. Bowel sounds are normal. He exhibits no distension and no mass. There is no tenderness. There is no guarding.   Musculoskeletal: He exhibits no edema or tenderness.   Lymphadenopathy:     He has no cervical adenopathy.   Neurological: He is alert and oriented to person, place, and time. No cranial nerve deficit or sensory deficit.   Skin: Skin is warm and dry. He is not diaphoretic.   Scabs on bilateral lower extremities   Nursing note and vitals reviewed.        CRANIAL NERVES     CN III, IV, VI   Pupils are equal, round, and reactive to light.  Extraocular motions are normal.        Significant Labs: All pertinent labs within the past 24 hours have been reviewed.    Significant Imaging: I have reviewed and interpreted all pertinent imaging results/findings within the past 24 hours.

## 2018-05-16 LAB
ANION GAP SERPL CALC-SCNC: 9 MMOL/L
BASOPHILS # BLD AUTO: 0 K/UL
BASOPHILS NFR BLD: 0 %
BUN SERPL-MCNC: 24 MG/DL
CALCIUM SERPL-MCNC: 8.3 MG/DL
CHLORIDE SERPL-SCNC: 106 MMOL/L
CO2 SERPL-SCNC: 26 MMOL/L
CREAT SERPL-MCNC: 1.3 MG/DL
DIFFERENTIAL METHOD: ABNORMAL
EOSINOPHIL # BLD AUTO: 0 K/UL
EOSINOPHIL NFR BLD: 0 %
ERYTHROCYTE [DISTWIDTH] IN BLOOD BY AUTOMATED COUNT: 16.3 %
EST. GFR  (AFRICAN AMERICAN): >60 ML/MIN/1.73 M^2
EST. GFR  (NON AFRICAN AMERICAN): >60 ML/MIN/1.73 M^2
GLUCOSE SERPL-MCNC: 209 MG/DL
HCT VFR BLD AUTO: 32.2 %
HGB BLD-MCNC: 10.8 G/DL
LYMPHOCYTES # BLD AUTO: 0.3 K/UL
LYMPHOCYTES NFR BLD: 2.2 %
MCH RBC QN AUTO: 29.8 PG
MCHC RBC AUTO-ENTMCNC: 33.5 G/DL
MCV RBC AUTO: 89 FL
MONOCYTES # BLD AUTO: 0.3 K/UL
MONOCYTES NFR BLD: 2.2 %
NEUTROPHILS # BLD AUTO: 13.8 K/UL
NEUTROPHILS NFR BLD: 95.6 %
PLATELET # BLD AUTO: 264 K/UL
PMV BLD AUTO: 9.5 FL
POCT GLUCOSE: 244 MG/DL (ref 70–110)
POCT GLUCOSE: 319 MG/DL (ref 70–110)
POCT GLUCOSE: 393 MG/DL (ref 70–110)
POCT GLUCOSE: 460 MG/DL (ref 70–110)
POCT GLUCOSE: >500 MG/DL (ref 70–110)
POCT GLUCOSE: >500 MG/DL (ref 70–110)
POTASSIUM SERPL-SCNC: 4 MMOL/L
RBC # BLD AUTO: 3.63 M/UL
SODIUM SERPL-SCNC: 141 MMOL/L
WBC # BLD AUTO: 14.4 K/UL

## 2018-05-16 PROCEDURE — 94640 AIRWAY INHALATION TREATMENT: CPT

## 2018-05-16 PROCEDURE — 97166 OT EVAL MOD COMPLEX 45 MIN: CPT

## 2018-05-16 PROCEDURE — 25000242 PHARM REV CODE 250 ALT 637 W/ HCPCS: Performed by: EMERGENCY MEDICINE

## 2018-05-16 PROCEDURE — 85025 COMPLETE CBC W/AUTO DIFF WBC: CPT

## 2018-05-16 PROCEDURE — 25000003 PHARM REV CODE 250: Performed by: HOSPITALIST

## 2018-05-16 PROCEDURE — 94761 N-INVAS EAR/PLS OXIMETRY MLT: CPT

## 2018-05-16 PROCEDURE — 25000242 PHARM REV CODE 250 ALT 637 W/ HCPCS: Performed by: HOSPITALIST

## 2018-05-16 PROCEDURE — 63600175 PHARM REV CODE 636 W HCPCS: Performed by: EMERGENCY MEDICINE

## 2018-05-16 PROCEDURE — 80048 BASIC METABOLIC PNL TOTAL CA: CPT

## 2018-05-16 PROCEDURE — 21400001 HC TELEMETRY ROOM

## 2018-05-16 PROCEDURE — 63600175 PHARM REV CODE 636 W HCPCS: Performed by: INTERNAL MEDICINE

## 2018-05-16 PROCEDURE — 25000003 PHARM REV CODE 250: Performed by: INTERNAL MEDICINE

## 2018-05-16 PROCEDURE — 36415 COLL VENOUS BLD VENIPUNCTURE: CPT

## 2018-05-16 RX ORDER — PREGABALIN 50 MG/1
150 CAPSULE ORAL 2 TIMES DAILY
Status: DISCONTINUED | OUTPATIENT
Start: 2018-05-17 | End: 2018-05-19 | Stop reason: HOSPADM

## 2018-05-16 RX ORDER — ENOXAPARIN SODIUM 100 MG/ML
40 INJECTION SUBCUTANEOUS EVERY 24 HOURS
Status: DISCONTINUED | OUTPATIENT
Start: 2018-05-16 | End: 2018-05-19 | Stop reason: HOSPADM

## 2018-05-16 RX ORDER — PREDNISONE 20 MG/1
40 TABLET ORAL DAILY
Status: DISCONTINUED | OUTPATIENT
Start: 2018-05-17 | End: 2018-05-17

## 2018-05-16 RX ORDER — IPRATROPIUM BROMIDE AND ALBUTEROL SULFATE 2.5; .5 MG/3ML; MG/3ML
3 SOLUTION RESPIRATORY (INHALATION) EVERY 4 HOURS
Status: DISCONTINUED | OUTPATIENT
Start: 2018-05-16 | End: 2018-05-19 | Stop reason: HOSPADM

## 2018-05-16 RX ORDER — TIOTROPIUM BROMIDE 18 UG/1
1 CAPSULE ORAL; RESPIRATORY (INHALATION) DAILY
Status: DISCONTINUED | OUTPATIENT
Start: 2018-05-17 | End: 2018-05-19 | Stop reason: HOSPADM

## 2018-05-16 RX ORDER — METHYLPREDNISOLONE SOD SUCC 125 MG
80 VIAL (EA) INJECTION EVERY 8 HOURS
Status: COMPLETED | OUTPATIENT
Start: 2018-05-16 | End: 2018-05-16

## 2018-05-16 RX ADMIN — IPRATROPIUM BROMIDE AND ALBUTEROL SULFATE 3 ML: .5; 2.5 SOLUTION RESPIRATORY (INHALATION) at 07:05

## 2018-05-16 RX ADMIN — METHYLPREDNISOLONE SODIUM SUCCINATE 80 MG: 125 INJECTION, POWDER, FOR SOLUTION INTRAMUSCULAR; INTRAVENOUS at 09:05

## 2018-05-16 RX ADMIN — METHYLPREDNISOLONE SODIUM SUCCINATE 80 MG: 125 INJECTION, POWDER, FOR SOLUTION INTRAMUSCULAR; INTRAVENOUS at 03:05

## 2018-05-16 RX ADMIN — INSULIN ASPART 3 UNITS: 100 INJECTION, SOLUTION INTRAVENOUS; SUBCUTANEOUS at 11:05

## 2018-05-16 RX ADMIN — HYDROXYZINE PAMOATE 25 MG: 25 CAPSULE ORAL at 08:05

## 2018-05-16 RX ADMIN — ENOXAPARIN SODIUM 40 MG: 100 INJECTION SUBCUTANEOUS at 04:05

## 2018-05-16 RX ADMIN — INSULIN ASPART 5 UNITS: 100 INJECTION, SOLUTION INTRAVENOUS; SUBCUTANEOUS at 08:05

## 2018-05-16 RX ADMIN — IPRATROPIUM BROMIDE AND ALBUTEROL SULFATE 3 ML: .5; 2.5 SOLUTION RESPIRATORY (INHALATION) at 04:05

## 2018-05-16 RX ADMIN — CYPROHEPTADINE HYDROCHLORIDE 4 MG: 4 TABLET ORAL at 08:05

## 2018-05-16 RX ADMIN — INSULIN ASPART 3 UNITS: 100 INJECTION, SOLUTION INTRAVENOUS; SUBCUTANEOUS at 08:05

## 2018-05-16 RX ADMIN — METHYLPREDNISOLONE SODIUM SUCCINATE 80 MG: 125 INJECTION, POWDER, FOR SOLUTION INTRAMUSCULAR; INTRAVENOUS at 05:05

## 2018-05-16 RX ADMIN — IPRATROPIUM BROMIDE AND ALBUTEROL SULFATE 3 ML: .5; 2.5 SOLUTION RESPIRATORY (INHALATION) at 11:05

## 2018-05-16 RX ADMIN — PREGABALIN 75 MG: 50 CAPSULE ORAL at 08:05

## 2018-05-16 RX ADMIN — INSULIN ASPART 2 UNITS: 100 INJECTION, SOLUTION INTRAVENOUS; SUBCUTANEOUS at 04:05

## 2018-05-16 RX ADMIN — FLUTICASONE FUROATE AND VILANTEROL TRIFENATATE 1 PUFF: 100; 25 POWDER RESPIRATORY (INHALATION) at 11:05

## 2018-05-16 RX ADMIN — INSULIN ASPART 3 UNITS: 100 INJECTION, SOLUTION INTRAVENOUS; SUBCUTANEOUS at 09:05

## 2018-05-16 RX ADMIN — PREGABALIN 75 MG: 50 CAPSULE ORAL at 11:05

## 2018-05-16 RX ADMIN — INSULIN ASPART 4 UNITS: 100 INJECTION, SOLUTION INTRAVENOUS; SUBCUTANEOUS at 11:05

## 2018-05-16 RX ADMIN — CYPROHEPTADINE HYDROCHLORIDE 4 MG: 4 TABLET ORAL at 04:05

## 2018-05-16 RX ADMIN — FLUTICASONE PROPIONATE 50 MCG: 50 SPRAY, METERED NASAL at 08:05

## 2018-05-16 RX ADMIN — INSULIN ASPART 3 UNITS: 100 INJECTION, SOLUTION INTRAVENOUS; SUBCUTANEOUS at 04:05

## 2018-05-16 NOTE — ASSESSMENT & PLAN NOTE
- presented with hypoxic and hypercapnic respiratory failure. Patient has background of chronic lung changes due to Hodgkin's lymphoma with CXR showing scarring in R lung. Patient reports weather change as cause of acute exacerbation. Not on O2 at home  - management as above

## 2018-05-16 NOTE — PLAN OF CARE
05/15/18 1830   Discharge Assessment   Assessment Type Discharge Planning Assessment   Assessment information obtained from? Medical Record   Expected Length of Stay (days) 2   Prior to hospitilization cognitive status: Alert/Oriented   Prior to hospitalization functional status: Independent   Patient/Family In Agreement With Plan other (see comments)   SW attempted assessment in ICU today--patient already transferred to tele floor. SW again attempted this pm, patient sleeping soundly. SW wrote contact information on communication board and left discharge planning begins at admission flyer with contact information with blue folder in room. SW/CM will continue assessment at later time, will follow and assist as needed.

## 2018-05-16 NOTE — SUBJECTIVE & OBJECTIVE
Interval History: weaned off nasal cannula but still with significant wheezing. Dyspneic on exertion    Review of Systems   Constitutional: Negative.    HENT: Negative.    Eyes: Negative.    Respiratory: Positive for shortness of breath and wheezing.    Cardiovascular: Negative.    Gastrointestinal: Negative.    Genitourinary: Negative.    Musculoskeletal: Negative.    Skin: Negative.    Neurological: Negative.    Hematological: Negative.    Psychiatric/Behavioral: Negative.      Objective:     Vital Signs (Most Recent):  Temp: 98.3 °F (36.8 °C) (05/16/18 1133)  Pulse: (!) 114 (05/16/18 1133)  Resp: 16 (05/16/18 1133)  BP: (!) 157/78 (05/16/18 1133)  SpO2: 97 % (05/16/18 1133) Vital Signs (24h Range):  Temp:  [97.6 °F (36.4 °C)-98.5 °F (36.9 °C)] 98.3 °F (36.8 °C)  Pulse:  [] 114  Resp:  [16-20] 16  SpO2:  [95 %-100 %] 97 %  BP: (123-157)/(58-86) 157/78     Weight: 49 kg (108 lb 0.4 oz)  Body mass index is 19.14 kg/m².    Intake/Output Summary (Last 24 hours) at 05/16/18 1613  Last data filed at 05/16/18 0600   Gross per 24 hour   Intake              710 ml   Output              300 ml   Net              410 ml      Physical Exam   Constitutional: He is oriented to person, place, and time. No distress.   Thin man   HENT:   Head: Normocephalic and atraumatic.   Nose: Nose normal.   Mouth/Throat: Oropharynx is clear and moist.   Eyes: Conjunctivae and EOM are normal. Pupils are equal, round, and reactive to light. No scleral icterus.   Neck: Neck supple. No JVD present. No tracheal deviation present. No thyromegaly present.   Cardiovascular: Intact distal pulses.  Exam reveals no gallop and no friction rub.    No murmur heard.  tachycardic   Pulmonary/Chest: Effort normal. No stridor. No respiratory distress. He has wheezes (throughout). He has no rales. He exhibits no tenderness.   At room air   Abdominal: Soft. Bowel sounds are normal. He exhibits no distension and no mass. There is no tenderness. There is no  guarding.   Musculoskeletal: He exhibits no edema or tenderness.   Lymphadenopathy:     He has no cervical adenopathy.   Neurological: He is alert and oriented to person, place, and time. No cranial nerve deficit or sensory deficit.   Skin: Skin is warm and dry. He is not diaphoretic.   Scabs on bilateral lower extremities   Nursing note and vitals reviewed.      Significant Labs: All pertinent labs within the past 24 hours have been reviewed.    Significant Imaging: I have reviewed all pertinent imaging results/findings within the past 24 hours.  I have reviewed and interpreted all pertinent imaging results/findings within the past 24 hours.

## 2018-05-16 NOTE — PT/OT/SLP EVAL
Occupational Therapy   Evaluation and Discharge Note    Name: Basilio Márquez  MRN: [id number]  Admitting Diagnosis:  <principal problem not specified>      Recommendations:     Discharge Recommendations: home  Discharge Equipment Recommendations:  none  Barriers to discharge:  None    History:     Occupational Profile:  Living Environment: Pt lives alone and states that he does not need help at home  Previous level of function: independent  Roles and Routines: normal routine  Equipment Owned:  none  Assistance upon Discharge: pt uses his insurance for transportation to MD appts    Past Medical History:   Diagnosis Date    Asthma     Cancer     Hodgkin's lymphoma in his 20s s/p chemotherapy    COPD (chronic obstructive pulmonary disease)     Diabetes mellitus     GERD (gastroesophageal reflux disease)     Hypertension     Renal disorder        History reviewed. No pertinent surgical history.    Subjective     Chief Complaint: none  Patient/Family stated goals:  To discharge to home  Communicated with: nurse Easley prior to session.  Pain/Comfort:  · Pain Rating 1: 0/10  · Pain Rating Post-Intervention 1: 0/10    Patients cultural, spiritual, Jainism conflicts given the current situation:      Objective:     Patient found with: peripheral IV    General Precautions: Standard, fall   Orthopedic Precautions:N/A   Braces: N/A     Occupational Performance:    Bed Mobility:    · Patient completed Scooting/Bridging with modified independence  · Patient completed Supine to Sit with modified independence    Functional Mobility/Transfers:  · Patient completed Sit <> Stand Transfer with modified independence  with  no assistive device   · Patient completed Bed <> Chair Transfer using Stand Pivot technique with modified independence with no assistive device  · Patient completed Toilet Transfer Stand Pivot technique with modified independence with  no AD  · Functional Mobility: Pt able to ambulate to bathroom and in room with  "no assistive device    Activities of Daily Living:  · Feeding:  modified independence    · Grooming: modified independence    · LB Dressing: modified independence    · Toileting: modified independence      Cognitive/Visual Perceptual:  Cognitive/Psychosocial Skills:     -       Oriented to: Person, Place, Time and Situation   -       Follows Commands/attention:Follows two-step commands  -       Communication: clear/fluent  -       Memory: No Deficits noted  -       Safety awareness/insight to disability: intact   -       Mood/Affect/Coping skills/emotional control: Appropriate to situation  Visual/Perceptual:      -Intact  no concerns    Physical Exam:  Upper Extremity Range of Motion:     -       Right Upper Extremity: WFL  -       Left Upper Extremity: WFL  Upper Extremity Strength:    -       Right Upper Extremity: WFL  -       Left Upper Extremity: WFL   Strength:    -       Right Upper Extremity: WFL  -       Left Upper Extremity: WFL  Fine Motor Coordination:    -       Intact  Gross motor coordination:   WFL    Patient left up in chair with all lines intact and call button in reach    [facility] 6 Click:  [facility] Total Score: 23    Education:    Assessment:     Basilio Márquez is a 57 y.o. male with a medical diagnosis of <principal problem not specified>. At this time, patient is functioning at their prior level of function and does not require further acute OT services.     Clinical Decision Makin.  OT Mod:  "Pt evaluation falls under moderate complexity for evaluation coding due to identification of 3-5 performance deficits noted as stated above. Eval required Min/Mod assistance to complete on this date and detailed assessment(s) were utilized. Moreover, an expanded review of history and occupational profile obtained with additional review of cognitive, physical and psychosocial hx."     Plan:     During this hospitalization, patient does not require further acute OT services.  Please re-consult if " situation changes.    · Plan of Care Reviewed with: patient    This Plan of care has been discussed with the patient who was involved in its development and understands and is in agreement with the identified goals and treatment plan    GOALS:    Occupational Therapy Goals     Not on file          Multidisciplinary Problems (Resolved)        Problem: Occupational Therapy Goal    Goal Priority Disciplines Outcome Interventions   Occupational Therapy Goal   (Resolved)     OT, PT/OT Outcome(s) achieved                    Time Tracking:     OT Date of Treatment: 05/16/18  OT Start Time: 0844  OT Stop Time: 0855  OT Total Time (min): 11 min    Billable Minutes:Evaluation 11     Caterina Patrick OT  5/16/2018

## 2018-05-16 NOTE — PROGRESS NOTES
05/16/18 0724   Patient Assessment/Suction   Level of Consciousness (AVPU) alert   Respiratory Effort Normal;Unlabored   Expansion/Accessory Muscles/Retractions expansion symmetric   All Lung Fields Breath Sounds wheezes, expiratory   Rhythm/Pattern, Respiratory pattern regular;depth regular   Cough Frequency infrequent   PRE-TX-O2-ETCO2   O2 Device (Oxygen Therapy) room air   SpO2 98 %   Pulse Oximetry Type Intermittent   $ Pulse Oximetry - Multiple Charge Pulse Oximetry - Multiple   Pulse 107   Resp 18   Aerosol Therapy   $ Aerosol Therapy Charges Aerosol Treatment   Respiratory Treatment Status given   SVN/Inhaler Treatment Route mask   Position During Treatment Sitting in bed   Patient Tolerance good   Post-Treatment   Post-treatment Heart Rate (beats/min) 96   Post-treatment Resp Rate (breaths/min) 18   All Fields Breath Sounds unchanged

## 2018-05-16 NOTE — NURSING
Notified Dr Tobin of pt's CBC >500. TORB for aspart 10 units once. Will carry out order and continue to monitor.

## 2018-05-16 NOTE — PLAN OF CARE
Problem: Occupational Therapy Goal  Goal: Occupational Therapy Goal  Outcome: Outcome(s) achieved Date Met: 05/16/18  OT completed evaluation with no needs at this time.

## 2018-05-16 NOTE — ASSESSMENT & PLAN NOTE
- secondary to COPD exacerbation. No evidence of chronic CO2 retention  - weaned off NC but still symptomatic on exertion and with plenty of wheezing  - continue steroids, LABA/ICS, add tiotropium, duo-nebs and supp as needed for goal SaO2 > 92%

## 2018-05-16 NOTE — PROGRESS NOTES
05/16/18 0728   Preset CPAP/BiPAP Settings   Mode Of Delivery BiPAP;Standby   Airway Device Type (no skin break down )

## 2018-05-16 NOTE — PROGRESS NOTES
[facility] Hospital Medicine  Progress Note    Patient Name: Basilio Márquez  MRN: [id number]  Patient Class: IP- Inpatient   Admission Date: 5/14/2018  Length of Stay: 1 days  Attending Physician: Tarsha Perez MD  Primary Care Provider: To Obtain Unable        Subjective:     Principal Problem:Acute respiratory failure with hypoxia and hypercapnia    HPI:  Mr. Basilio Márquez is a 57 y.o. 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 States this started gradually a few days ago with the weather change. Associated with chest congestion and cough with no sputum production. No fevers or chills. Chest pain only with coughing. No sick contacts. Tried his home inhalers and nebulizers with minimal improvement. Presented to ED for worsening shortness of breath.     Hospital Course:  Admitted to ICU with hypercapnic respiratory failure due to COPD exacerbation. Started on BiPAP, solumedrol, and nebs with improvement. Given ceftriaxone and azithromycin in ED; no signs of PNA, will discontinue. Weaned to nasal cannula on 5/15 AM.     Interval History: weaned off nasal cannula but still with significant wheezing. Dyspneic on exertion    Review of Systems   Constitutional: Negative.    HENT: Negative.    Eyes: Negative.    Respiratory: Positive for shortness of breath and wheezing.    Cardiovascular: Negative.    Gastrointestinal: Negative.    Genitourinary: Negative.    Musculoskeletal: Negative.    Skin: Negative.    Neurological: Negative.    Hematological: Negative.    Psychiatric/Behavioral: Negative.      Objective:     Vital Signs (Most Recent):  Temp: 98.3 °F (36.8 °C) (05/16/18 1133)  Pulse: (!) 114 (05/16/18 1133)  Resp: 16 (05/16/18 1133)  BP: (!) 157/78 (05/16/18 1133)  SpO2: 97 % (05/16/18 1133) Vital Signs (24h Range):  Temp:  [97.6 °F (36.4 °C)-98.5 °F (36.9 °C)] 98.3 °F (36.8 °C)  Pulse:  [] 114  Resp:  [16-20]  16  SpO2:  [95 %-100 %] 97 %  BP: (123-157)/(58-86) 157/78     Weight: 49 kg (108 lb 0.4 oz)  Body mass index is 19.14 kg/m².    Intake/Output Summary (Last 24 hours) at 05/16/18 1613  Last data filed at 05/16/18 0600   Gross per 24 hour   Intake              710 ml   Output              300 ml   Net              410 ml      Physical Exam   Constitutional: He is oriented to person, place, and time. No distress.   Thin man   HENT:   Head: Normocephalic and atraumatic.   Nose: Nose normal.   Mouth/Throat: Oropharynx is clear and moist.   Eyes: Conjunctivae and EOM are normal. Pupils are equal, round, and reactive to light. No scleral icterus.   Neck: Neck supple. No JVD present. No tracheal deviation present. No thyromegaly present.   Cardiovascular: Intact distal pulses.  Exam reveals no gallop and no friction rub.    No murmur heard.  tachycardic   Pulmonary/Chest: Effort normal. No stridor. No respiratory distress. He has wheezes (throughout). He has no rales. He exhibits no tenderness.   At room air   Abdominal: Soft. Bowel sounds are normal. He exhibits no distension and no mass. There is no tenderness. There is no guarding.   Musculoskeletal: He exhibits no edema or tenderness.   Lymphadenopathy:     He has no cervical adenopathy.   Neurological: He is alert and oriented to person, place, and time. No cranial nerve deficit or sensory deficit.   Skin: Skin is warm and dry. He is not diaphoretic.   Scabs on bilateral lower extremities   Nursing note and vitals reviewed.      Significant Labs: All pertinent labs within the past 24 hours have been reviewed.    Significant Imaging: I have reviewed all pertinent imaging results/findings within the past 24 hours.  I have reviewed and interpreted all pertinent imaging results/findings within the past 24 hours.    Assessment/Plan:      * Acute respiratory failure with hypoxia and hypercapnia    - secondary to COPD exacerbation. No evidence of chronic CO2 retention  -  "weaned off NC but still symptomatic on exertion and with plenty of wheezing  - continue steroids, LABA/ICS, add tiotropium, duo-nebs and supp as needed for goal SaO2 > 92%          COPD exacerbation    - presented with hypoxic and hypercapnic respiratory failure. Patient has background of chronic lung changes due to Hodgkin's lymphoma with CXR showing scarring in R lung. Patient reports weather change as cause of acute exacerbation. Not on O2 at home  - management as above          DREW (acute kidney injury)    - presented with Cr 2  - unknown baseline, has diagnosis of "renal insufficiency" but unknown CKD stage. Last Cr in system is 1  - improved to 1.5 with IVF, suggesting prerenal etiology  - continue to monitor  - renal dose all meds, avoid nephrotoxins           Malnutrition of moderate degree    - as evidenced by loss of subQ fat and muscle wasting as well as falls at home. Patient also on cyproheptadine as outpatient   - increase diet to 2000 samm          Leg cramping    Neuropathic pain 2/2 diabetes. Restart pregabalin        Type 2 diabetes mellitus with hyperglycemia, without long-term current use of insulin    -  A1c 11.1%. On metformin alone at home  - presented with hyperglycemia, improved rapidly with IVF  - ADA diet, accuchecks ACHS, SSI, hypoglycemic protocol  - adjust insulin for goal BG < 180. Currently not at goal          Anemia    - presents with Hgb 11, normal MCV  - likely anemia of chronic disease  - monitor             VTE Risk Mitigation         Ordered     IP VTE LOW RISK PATIENT  Once      05/15/18 0655     Place sequential compression device  Until discontinued      05/15/18 0655              Tarsha Long MD  Department of Hospital Medicine   [facility] [place]  "

## 2018-05-16 NOTE — ASSESSMENT & PLAN NOTE
-  A1c 11.1%. On metformin alone at home  - presented with hyperglycemia, improved rapidly with IVF  - ADA diet, accuchecks ACHS, SSI, hypoglycemic protocol  - adjust insulin for goal BG < 180. Currently not at goal

## 2018-05-17 LAB
ANION GAP SERPL CALC-SCNC: 7 MMOL/L
BUN SERPL-MCNC: 30 MG/DL
CALCIUM SERPL-MCNC: 8.5 MG/DL
CHLORIDE SERPL-SCNC: 102 MMOL/L
CO2 SERPL-SCNC: 26 MMOL/L
CREAT SERPL-MCNC: 1.5 MG/DL
EST. GFR  (AFRICAN AMERICAN): 60 ML/MIN/1.73 M^2
EST. GFR  (NON AFRICAN AMERICAN): 52 ML/MIN/1.73 M^2
GLUCOSE SERPL-MCNC: 473 MG/DL
POCT GLUCOSE: 249 MG/DL (ref 70–110)
POCT GLUCOSE: 394 MG/DL (ref 70–110)
POCT GLUCOSE: 417 MG/DL (ref 70–110)
POCT GLUCOSE: 485 MG/DL (ref 70–110)
POTASSIUM SERPL-SCNC: 5.3 MMOL/L
SODIUM SERPL-SCNC: 135 MMOL/L

## 2018-05-17 PROCEDURE — 94640 AIRWAY INHALATION TREATMENT: CPT

## 2018-05-17 PROCEDURE — 94761 N-INVAS EAR/PLS OXIMETRY MLT: CPT

## 2018-05-17 PROCEDURE — 80048 BASIC METABOLIC PNL TOTAL CA: CPT

## 2018-05-17 PROCEDURE — 25000003 PHARM REV CODE 250: Performed by: HOSPITALIST

## 2018-05-17 PROCEDURE — 94760 N-INVAS EAR/PLS OXIMETRY 1: CPT

## 2018-05-17 PROCEDURE — 25000003 PHARM REV CODE 250: Performed by: INTERNAL MEDICINE

## 2018-05-17 PROCEDURE — 21400001 HC TELEMETRY ROOM

## 2018-05-17 PROCEDURE — 25000242 PHARM REV CODE 250 ALT 637 W/ HCPCS: Performed by: EMERGENCY MEDICINE

## 2018-05-17 PROCEDURE — 25000242 PHARM REV CODE 250 ALT 637 W/ HCPCS: Performed by: INTERNAL MEDICINE

## 2018-05-17 PROCEDURE — 63600175 PHARM REV CODE 636 W HCPCS: Performed by: INTERNAL MEDICINE

## 2018-05-17 PROCEDURE — 99900035 HC TECH TIME PER 15 MIN (STAT)

## 2018-05-17 PROCEDURE — 36415 COLL VENOUS BLD VENIPUNCTURE: CPT

## 2018-05-17 RX ORDER — INSULIN ASPART 100 [IU]/ML
6 INJECTION, SOLUTION INTRAVENOUS; SUBCUTANEOUS
Status: DISCONTINUED | OUTPATIENT
Start: 2018-05-17 | End: 2018-05-18

## 2018-05-17 RX ORDER — PREDNISONE 20 MG/1
20 TABLET ORAL DAILY
Status: DISCONTINUED | OUTPATIENT
Start: 2018-05-18 | End: 2018-05-18

## 2018-05-17 RX ORDER — PREDNISONE 20 MG/1
40 TABLET ORAL DAILY
Status: DISCONTINUED | OUTPATIENT
Start: 2018-05-18 | End: 2018-05-17

## 2018-05-17 RX ADMIN — INSULIN ASPART 5 UNITS: 100 INJECTION, SOLUTION INTRAVENOUS; SUBCUTANEOUS at 08:05

## 2018-05-17 RX ADMIN — SODIUM CHLORIDE 1000 ML: 0.9 INJECTION, SOLUTION INTRAVENOUS at 09:05

## 2018-05-17 RX ADMIN — CYPROHEPTADINE HYDROCHLORIDE 4 MG: 4 TABLET ORAL at 03:05

## 2018-05-17 RX ADMIN — TIOTROPIUM BROMIDE 18 MCG: 18 CAPSULE ORAL; RESPIRATORY (INHALATION) at 07:05

## 2018-05-17 RX ADMIN — IPRATROPIUM BROMIDE AND ALBUTEROL SULFATE 3 ML: .5; 2.5 SOLUTION RESPIRATORY (INHALATION) at 07:05

## 2018-05-17 RX ADMIN — PREGABALIN 150 MG: 50 CAPSULE ORAL at 08:05

## 2018-05-17 RX ADMIN — INSULIN ASPART 5 UNITS: 100 INJECTION, SOLUTION INTRAVENOUS; SUBCUTANEOUS at 12:05

## 2018-05-17 RX ADMIN — IPRATROPIUM BROMIDE AND ALBUTEROL SULFATE 3 ML: .5; 2.5 SOLUTION RESPIRATORY (INHALATION) at 12:05

## 2018-05-17 RX ADMIN — PREDNISONE 40 MG: 20 TABLET ORAL at 08:05

## 2018-05-17 RX ADMIN — IPRATROPIUM BROMIDE AND ALBUTEROL SULFATE 3 ML: .5; 2.5 SOLUTION RESPIRATORY (INHALATION) at 03:05

## 2018-05-17 RX ADMIN — INSULIN ASPART 5 UNITS: 100 INJECTION, SOLUTION INTRAVENOUS; SUBCUTANEOUS at 04:05

## 2018-05-17 RX ADMIN — HYDROXYZINE PAMOATE 25 MG: 25 CAPSULE ORAL at 09:05

## 2018-05-17 RX ADMIN — IPRATROPIUM BROMIDE AND ALBUTEROL SULFATE 3 ML: .5; 2.5 SOLUTION RESPIRATORY (INHALATION) at 11:05

## 2018-05-17 RX ADMIN — FLUTICASONE FUROATE AND VILANTEROL TRIFENATATE 1 PUFF: 100; 25 POWDER RESPIRATORY (INHALATION) at 07:05

## 2018-05-17 RX ADMIN — ENOXAPARIN SODIUM 40 MG: 100 INJECTION SUBCUTANEOUS at 04:05

## 2018-05-17 RX ADMIN — INSULIN ASPART 6 UNITS: 100 INJECTION, SOLUTION INTRAVENOUS; SUBCUTANEOUS at 04:05

## 2018-05-17 RX ADMIN — INSULIN ASPART 3 UNITS: 100 INJECTION, SOLUTION INTRAVENOUS; SUBCUTANEOUS at 08:05

## 2018-05-17 RX ADMIN — CYPROHEPTADINE HYDROCHLORIDE 4 MG: 4 TABLET ORAL at 08:05

## 2018-05-17 RX ADMIN — INSULIN ASPART 1 UNITS: 100 INJECTION, SOLUTION INTRAVENOUS; SUBCUTANEOUS at 09:05

## 2018-05-17 RX ADMIN — IPRATROPIUM BROMIDE AND ALBUTEROL SULFATE 3 ML: .5; 2.5 SOLUTION RESPIRATORY (INHALATION) at 08:05

## 2018-05-17 RX ADMIN — INSULIN ASPART 6 UNITS: 100 INJECTION, SOLUTION INTRAVENOUS; SUBCUTANEOUS at 12:05

## 2018-05-17 NOTE — ASSESSMENT & PLAN NOTE
- improved with fluids, suggesting pre-renal etiology, however Cr back up again a bit along with mild hyperkalemia. Will give more fluids today and recheck renal panel in AM  - renal dose all meds, avoid nephrotoxins

## 2018-05-17 NOTE — PLAN OF CARE
Problem: Diabetes, Type 2 (Adult)  Intervention: Support/Optimize Psychosocial Response to Condition   05/17/18 1351   Coping/Psychosocial Interventions   Supportive Measures active listening utilized;relaxation techniques promoted;self-care encouraged   Environmental Support calm environment promoted     Intervention: Optimize Glycemic Control   05/17/18 1351   Nutrition Interventions   Glycemic Management blood glucose monitoring;oral hydration promoted       Goal: Signs and Symptoms of Listed Potential Problems Will be Absent, Minimized or Managed (Diabetes, Type 2)  Signs and symptoms of listed potential problems will be absent, minimized or managed by discharge/transition of care (reference Diabetes, Type 2 (Adult) CPG).    05/17/18 1351   Diabetes, Type 2   Problems Assessed (Type 2 Diabetes) hyperglycemia   Problems Present (Type 2 Diabetes) hyperglycemia

## 2018-05-17 NOTE — NURSING
Notified by PCT of pt's . Rechecked CBG value of 393 obtained. Administer 3 units of Aspart as ordered. Will continue to monitor.

## 2018-05-17 NOTE — ASSESSMENT & PLAN NOTE
- secondary to COPD exacerbation. No evidence of chronic CO2 retention  - weaned off NC but still symptomatic on exertion and with plenty of wheezing (better)  - continue steroids (tapered to prednisone) x 2 more days to complete a total of 5, LABA/ICS, continue tiotropium, duo-nebs and supp as needed for goal SaO2 > 92%

## 2018-05-17 NOTE — SUBJECTIVE & OBJECTIVE
Interval History: still SOB on exertion but better. Less wheezing, more air moving. BG still not at goal.     Review of Systems   Constitutional: Negative.    HENT: Negative.    Eyes: Negative.    Respiratory: Positive for shortness of breath and wheezing.    Cardiovascular: Negative.    Gastrointestinal: Negative.    Genitourinary: Negative.    Musculoskeletal: Negative.    Skin: Negative.    Neurological: Negative.    Hematological: Negative.    Psychiatric/Behavioral: Negative.      Objective:     Vital Signs (Most Recent):  Temp: 97.9 °F (36.6 °C) (05/17/18 0727)  Pulse: 80 (05/17/18 0744)  Resp: 18 (05/17/18 0744)  BP: 131/78 (05/17/18 0727)  SpO2: 100 % (05/17/18 0728) Vital Signs (24h Range):  Temp:  [96.2 °F (35.7 °C)-98.3 °F (36.8 °C)] 97.9 °F (36.6 °C)  Pulse:  [] 80  Resp:  [16-20] 18  SpO2:  [97 %-100 %] 100 %  BP: (124-157)/(72-88) 131/78     Weight: 48.7 kg (107 lb 5.8 oz)  Body mass index is 19.02 kg/m².    Intake/Output Summary (Last 24 hours) at 05/17/18 0850  Last data filed at 05/17/18 0728   Gross per 24 hour   Intake                0 ml   Output              350 ml   Net             -350 ml      Physical Exam   Constitutional: He is oriented to person, place, and time. No distress.   Thin man   HENT:   Head: Normocephalic and atraumatic.   Nose: Nose normal.   Mouth/Throat: Oropharynx is clear and moist.   Eyes: Conjunctivae and EOM are normal. Pupils are equal, round, and reactive to light. No scleral icterus.   Neck: Neck supple. No JVD present. No tracheal deviation present. No thyromegaly present.   Cardiovascular: Normal rate, regular rhythm, normal heart sounds and intact distal pulses.  Exam reveals no gallop and no friction rub.    No murmur heard.  Pulmonary/Chest: Effort normal. No stridor. No respiratory distress. He has wheezes (throughout, less). He has no rales. He exhibits no tenderness.   At room air   Abdominal: Soft. Bowel sounds are normal. He exhibits no distension and  no mass. There is no tenderness. There is no guarding.   Musculoskeletal: He exhibits no edema or tenderness.   Lymphadenopathy:     He has no cervical adenopathy.   Neurological: He is alert and oriented to person, place, and time. No cranial nerve deficit or sensory deficit.   Skin: Skin is warm and dry. He is not diaphoretic.   Scabs on bilateral lower extremities   Nursing note and vitals reviewed.      Significant Labs: All pertinent labs within the past 24 hours have been reviewed.    Significant Imaging: I have reviewed all pertinent imaging results/findings within the past 24 hours.  I have reviewed and interpreted all pertinent imaging results/findings within the past 24 hours.

## 2018-05-17 NOTE — PLAN OF CARE
05/17/18 1253   Discharge Assessment   Assessment Type Discharge Planning Assessment   Assessment information obtained from? Patient   Expected Length of Stay (days) 3   Communicated expected length of stay with patient/caregiver yes   Prior to hospitilization cognitive status: Alert/Oriented   Prior to hospitalization functional status: Independent   Current cognitive status: Alert/Oriented   Current Functional Status: Independent   Lives With alone   Able to Return to Prior Arrangements yes   Is patient able to care for self after discharge? Yes   Who are your caregiver(s) and their phone number(s)? none  (pt states that he has no friends and no family at all to assist with healthcare needs)   Patient's perception of discharge disposition home or selfcare  (patent wants to be placed ICF, states he gets $506 per month)   Readmission Within The Last 30 Days previous discharge plan unsuccessful   If yes, most recent facility name: [facility]   Patient currently being followed by outpatient case management? No   Patient currently receives any other outside agency services? No   Equipment Currently Used at Home none   Do you have any problems affording any of your prescribed medications? No   Is the patient taking medications as prescribed? yes   Does the patient have transportation home? No   Does the patient receive services at the [facility]? No   Discharge Plan A Other  (tbd)   Discharge Plan B Other

## 2018-05-17 NOTE — PROGRESS NOTES
[facility] Hospital Medicine  Progress Note    Patient Name: Basilio Márquez  MRN: [id number]  Patient Class: IP- Inpatient   Admission Date: 5/14/2018  Length of Stay: 2 days  Attending Physician: Tarsha Perez MD  Primary Care Provider: To Obtain Unable        Subjective:     Principal Problem:Acute respiratory failure with hypoxia and hypercapnia    HPI:  Mr. Basilio Márquez is a 57 y.o. 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 States this started gradually a few days ago with the weather change. Associated with chest congestion and cough with no sputum production. No fevers or chills. Chest pain only with coughing. No sick contacts. Tried his home inhalers and nebulizers with minimal improvement. Presented to ED for worsening shortness of breath.     Hospital Course:  Admitted to ICU with hypercapnic respiratory failure due to COPD exacerbation. Started on BiPAP, solumedrol, and nebs with improvement. Given ceftriaxone and azithromycin in ED; no signs of PNA, will discontinue. Weaned to nasal cannula on 5/15 AM.     Interval History: still SOB on exertion but better. Less wheezing, more air moving. BG still not at goal.     Review of Systems   Constitutional: Negative.    HENT: Negative.    Eyes: Negative.    Respiratory: Positive for shortness of breath and wheezing.    Cardiovascular: Negative.    Gastrointestinal: Negative.    Genitourinary: Negative.    Musculoskeletal: Negative.    Skin: Negative.    Neurological: Negative.    Hematological: Negative.    Psychiatric/Behavioral: Negative.      Objective:     Vital Signs (Most Recent):  Temp: 97.9 °F (36.6 °C) (05/17/18 0727)  Pulse: 80 (05/17/18 0744)  Resp: 18 (05/17/18 0744)  BP: 131/78 (05/17/18 0727)  SpO2: 100 % (05/17/18 0728) Vital Signs (24h Range):  Temp:  [96.2 °F (35.7 °C)-98.3 °F (36.8 °C)] 97.9 °F (36.6 °C)  Pulse:  [] 80  Resp:  [16-20] 18  SpO2:   [97 %-100 %] 100 %  BP: (124-157)/(72-88) 131/78     Weight: 48.7 kg (107 lb 5.8 oz)  Body mass index is 19.02 kg/m².    Intake/Output Summary (Last 24 hours) at 05/17/18 0850  Last data filed at 05/17/18 0728   Gross per 24 hour   Intake                0 ml   Output              350 ml   Net             -350 ml      Physical Exam   Constitutional: He is oriented to person, place, and time. No distress.   Thin man   HENT:   Head: Normocephalic and atraumatic.   Nose: Nose normal.   Mouth/Throat: Oropharynx is clear and moist.   Eyes: Conjunctivae and EOM are normal. Pupils are equal, round, and reactive to light. No scleral icterus.   Neck: Neck supple. No JVD present. No tracheal deviation present. No thyromegaly present.   Cardiovascular: Normal rate, regular rhythm, normal heart sounds and intact distal pulses.  Exam reveals no gallop and no friction rub.    No murmur heard.  Pulmonary/Chest: Effort normal. No stridor. No respiratory distress. He has wheezes (throughout, less). He has no rales. He exhibits no tenderness.   At room air   Abdominal: Soft. Bowel sounds are normal. He exhibits no distension and no mass. There is no tenderness. There is no guarding.   Musculoskeletal: He exhibits no edema or tenderness.   Lymphadenopathy:     He has no cervical adenopathy.   Neurological: He is alert and oriented to person, place, and time. No cranial nerve deficit or sensory deficit.   Skin: Skin is warm and dry. He is not diaphoretic.   Scabs on bilateral lower extremities   Nursing note and vitals reviewed.      Significant Labs: All pertinent labs within the past 24 hours have been reviewed.    Significant Imaging: I have reviewed all pertinent imaging results/findings within the past 24 hours.  I have reviewed and interpreted all pertinent imaging results/findings within the past 24 hours.    Assessment/Plan:      * Acute respiratory failure with hypoxia and hypercapnia    - secondary to COPD exacerbation. No  evidence of chronic CO2 retention  - weaned off NC but still symptomatic on exertion and with plenty of wheezing (better)  - continue steroids (tapered to prednisone) x 2 more days to complete a total of 5, LABA/ICS, continue tiotropium, duo-nebs and supp as needed for goal SaO2 > 92%          COPD exacerbation    - presented with hypoxic and hypercapnic respiratory failure. Patient has background of chronic lung changes due to Hodgkin's lymphoma with CXR showing scarring in R lung. Patient reports weather change as cause of acute exacerbation. Not on O2 at home  - management as above          DREW (acute kidney injury)    - improved with fluids, suggesting pre-renal etiology, however Cr back up again a bit along with mild hyperkalemia. Will give more fluids today and recheck renal panel in AM  - renal dose all meds, avoid nephrotoxins           Malnutrition of moderate degree    - as evidenced by loss of subQ fat and muscle wasting as well as falls at home. Patient also on cyproheptadine as outpatient   - increase diet to 2000 samm          Leg cramping    Neuropathic pain 2/2 diabetes. Restart pregabalin        Type 2 diabetes mellitus with hyperglycemia, without long-term current use of insulin    -  A1c 11.1%. On metformin alone at home  - presented with hyperglycemia, improved rapidly with IVF  - ADA diet, accuchecks ACHS, SSI, hypoglycemic protocol  - adjust insulin for goal BG < 180. Currently not at goal          Anemia    - presents with Hgb 11, normal MCV  - likely anemia of chronic disease  - monitor             VTE Risk Mitigation         Ordered     enoxaparin injection 40 mg  Daily      05/16/18 1620     IP VTE LOW RISK PATIENT  Once      05/15/18 0655     Place sequential compression device  Until discontinued      05/15/18 0655              Tarsha Long MD  Department of Hospital Medicine   [facility]

## 2018-05-17 NOTE — PLAN OF CARE
Introduced myself to patient and filled out information on white board. Explained blue DC folder and asked patient about help at home and he stated that he had no friends or no family and no help at all at home to assits with managing his care.  I told him that I would be assisting patient with managing care at home throughout hospitalization.  Discussed pharmacy preference as well.       [facility] [id number] [place] [address] [facility] [address] [facility]  [address] [facility] [place] [address]  Phone: [phone] Fax: [phone]      Patient states that he wants to be placed in a nursing home because he needs help managing his care because he says that he keeps readmitting with COPD and that his blood sugars stay elevated.  His PCP is Dr Prado but patient does not like him and says that he is going to call [facility] and have PCP changed soon. HE states that he uses insulin PRN. HE states that he has an income of 506$ per month. I asked him if he drinks ETOH and he states that he does sometimes.     Continued to delve into request with patient.  Offered pt outpatient SW with [facility] and [facility] to assist with managing his health at home. Patient declines HH stating that he is not home much. I asked him where he goes during the day and he stated that he like to walk a lot. It seems that the patient has a room mate that just left and now the patient will not be able to afford rent and has no place to go.  He states that [place] would [place] than go to a shelter.  He states [facility] and they said he does not meet criteria.      Touched base with Dr Long and with Gretchen with [facility].  After further investigation, it seems that the patient has a history of chronic ETOH abuse. He has been to the ED mutiple times this year at [facility] last week.    I am going to give the patient resources for homeless shelters and nursing homes so that he can  follow up once discharged,. He is in agreement with this

## 2018-05-17 NOTE — NURSING
Per glucometer pt bg >500. Covered with scheduled and sliding scale insulin. Chris arnold notified. No new orders given at this time.

## 2018-05-18 PROBLEM — F10.20 ALCOHOL USE DISORDER, MODERATE, DEPENDENCE: Status: ACTIVE | Noted: 2018-05-18

## 2018-05-18 LAB
ALBUMIN SERPL BCP-MCNC: 2.7 G/DL
ANION GAP SERPL CALC-SCNC: 5 MMOL/L
BUN SERPL-MCNC: 30 MG/DL
CALCIUM SERPL-MCNC: 8.6 MG/DL
CHLORIDE SERPL-SCNC: 105 MMOL/L
CO2 SERPL-SCNC: 26 MMOL/L
CREAT SERPL-MCNC: 1.6 MG/DL
EST. GFR  (AFRICAN AMERICAN): 55 ML/MIN/1.73 M^2
EST. GFR  (NON AFRICAN AMERICAN): 48 ML/MIN/1.73 M^2
GLUCOSE SERPL-MCNC: 398 MG/DL
PHOSPHATE SERPL-MCNC: 2.2 MG/DL
POCT GLUCOSE: 170 MG/DL (ref 70–110)
POCT GLUCOSE: 230 MG/DL (ref 70–110)
POCT GLUCOSE: 367 MG/DL (ref 70–110)
POCT GLUCOSE: 62 MG/DL (ref 70–110)
POCT GLUCOSE: >500 MG/DL (ref 70–110)
POTASSIUM SERPL-SCNC: 4.7 MMOL/L
SODIUM SERPL-SCNC: 136 MMOL/L

## 2018-05-18 PROCEDURE — 99900035 HC TECH TIME PER 15 MIN (STAT)

## 2018-05-18 PROCEDURE — 25000003 PHARM REV CODE 250: Performed by: HOSPITALIST

## 2018-05-18 PROCEDURE — 36415 COLL VENOUS BLD VENIPUNCTURE: CPT

## 2018-05-18 PROCEDURE — 21400001 HC TELEMETRY ROOM

## 2018-05-18 PROCEDURE — 94761 N-INVAS EAR/PLS OXIMETRY MLT: CPT

## 2018-05-18 PROCEDURE — 94640 AIRWAY INHALATION TREATMENT: CPT

## 2018-05-18 PROCEDURE — 25000003 PHARM REV CODE 250: Performed by: INTERNAL MEDICINE

## 2018-05-18 PROCEDURE — 63600175 PHARM REV CODE 636 W HCPCS: Performed by: INTERNAL MEDICINE

## 2018-05-18 PROCEDURE — 80069 RENAL FUNCTION PANEL: CPT

## 2018-05-18 PROCEDURE — 25000242 PHARM REV CODE 250 ALT 637 W/ HCPCS: Performed by: EMERGENCY MEDICINE

## 2018-05-18 RX ORDER — SODIUM CHLORIDE 9 MG/ML
INJECTION, SOLUTION INTRAVENOUS CONTINUOUS
Status: ACTIVE | OUTPATIENT
Start: 2018-05-18 | End: 2018-05-18

## 2018-05-18 RX ORDER — INSULIN ASPART 100 [IU]/ML
10 INJECTION, SOLUTION INTRAVENOUS; SUBCUTANEOUS
Status: DISCONTINUED | OUTPATIENT
Start: 2018-05-18 | End: 2018-05-19 | Stop reason: HOSPADM

## 2018-05-18 RX ADMIN — IPRATROPIUM BROMIDE AND ALBUTEROL SULFATE 3 ML: .5; 2.5 SOLUTION RESPIRATORY (INHALATION) at 12:05

## 2018-05-18 RX ADMIN — INSULIN DETEMIR 25 UNITS: 100 INJECTION, SOLUTION SUBCUTANEOUS at 09:05

## 2018-05-18 RX ADMIN — INSULIN ASPART 2 UNITS: 100 INJECTION, SOLUTION INTRAVENOUS; SUBCUTANEOUS at 12:05

## 2018-05-18 RX ADMIN — SODIUM CHLORIDE: 0.9 INJECTION, SOLUTION INTRAVENOUS at 08:05

## 2018-05-18 RX ADMIN — INSULIN DETEMIR 10 UNITS: 100 INJECTION, SOLUTION SUBCUTANEOUS at 08:05

## 2018-05-18 RX ADMIN — PREGABALIN 150 MG: 50 CAPSULE ORAL at 09:05

## 2018-05-18 RX ADMIN — IPRATROPIUM BROMIDE AND ALBUTEROL SULFATE 3 ML: .5; 2.5 SOLUTION RESPIRATORY (INHALATION) at 04:05

## 2018-05-18 RX ADMIN — ENOXAPARIN SODIUM 40 MG: 100 INJECTION SUBCUTANEOUS at 05:05

## 2018-05-18 RX ADMIN — TIOTROPIUM BROMIDE 18 MCG: 18 CAPSULE ORAL; RESPIRATORY (INHALATION) at 07:05

## 2018-05-18 RX ADMIN — CYPROHEPTADINE HYDROCHLORIDE 4 MG: 4 TABLET ORAL at 03:05

## 2018-05-18 RX ADMIN — IPRATROPIUM BROMIDE AND ALBUTEROL SULFATE 3 ML: .5; 2.5 SOLUTION RESPIRATORY (INHALATION) at 08:05

## 2018-05-18 RX ADMIN — IPRATROPIUM BROMIDE AND ALBUTEROL SULFATE 3 ML: .5; 2.5 SOLUTION RESPIRATORY (INHALATION) at 11:05

## 2018-05-18 RX ADMIN — INSULIN ASPART 10 UNITS: 100 INJECTION, SOLUTION INTRAVENOUS; SUBCUTANEOUS at 12:05

## 2018-05-18 RX ADMIN — IPRATROPIUM BROMIDE AND ALBUTEROL SULFATE 3 ML: .5; 2.5 SOLUTION RESPIRATORY (INHALATION) at 07:05

## 2018-05-18 RX ADMIN — CYPROHEPTADINE HYDROCHLORIDE 4 MG: 4 TABLET ORAL at 08:05

## 2018-05-18 RX ADMIN — CYPROHEPTADINE HYDROCHLORIDE 4 MG: 4 TABLET ORAL at 09:05

## 2018-05-18 RX ADMIN — FLUTICASONE FUROATE AND VILANTEROL TRIFENATATE 1 PUFF: 100; 25 POWDER RESPIRATORY (INHALATION) at 07:05

## 2018-05-18 RX ADMIN — INSULIN ASPART 5 UNITS: 100 INJECTION, SOLUTION INTRAVENOUS; SUBCUTANEOUS at 08:05

## 2018-05-18 RX ADMIN — HYDROXYZINE PAMOATE 25 MG: 25 CAPSULE ORAL at 09:05

## 2018-05-18 RX ADMIN — SODIUM CHLORIDE: 0.9 INJECTION, SOLUTION INTRAVENOUS at 04:05

## 2018-05-18 RX ADMIN — PREGABALIN 150 MG: 50 CAPSULE ORAL at 08:05

## 2018-05-18 RX ADMIN — INSULIN ASPART 10 UNITS: 100 INJECTION, SOLUTION INTRAVENOUS; SUBCUTANEOUS at 05:05

## 2018-05-18 RX ADMIN — INSULIN ASPART 10 UNITS: 100 INJECTION, SOLUTION INTRAVENOUS; SUBCUTANEOUS at 08:05

## 2018-05-18 NOTE — PROGRESS NOTES
[facility] Hospital Medicine  Progress Note    Patient Name: Basilio Márquez  MRN: [id number]  Patient Class: IP- Inpatient   Admission Date: 5/14/2018  Length of Stay: 3 days  Attending Physician: Tarsha Perez MD  Primary Care Provider: Finn Prado MD        Subjective:     Principal Problem:Acute respiratory failure with hypoxia and hypercapnia    HPI:  Mr. Basilio Márquez is a 57 y.o. 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 States this started gradually a few days ago with the weather change. Associated with chest congestion and cough with no sputum production. No fevers or chills. Chest pain only with coughing. No sick contacts. Tried his home inhalers and nebulizers with minimal improvement. Presented to ED for worsening shortness of breath.     Hospital Course:  Admitted to ICU with hypercapnic respiratory failure due to COPD exacerbation. Started on BiPAP, solumedrol, and nebs with improvement. Given ceftriaxone and azithromycin in ED; no signs of PNA, will discontinue. Weaned to nasal cannula on 5/15 AM.     Interval History: stable from breathing standpoint although still wheezing. BG still very high despite increasing insulin dose. Patient denied eating between meals. Stated accuchecks are prior to meals.     Review of Systems   Constitutional: Negative.    HENT: Negative.    Eyes: Negative.    Respiratory: Positive for wheezing. Negative for shortness of breath.    Cardiovascular: Negative.    Gastrointestinal: Negative.    Genitourinary: Negative.    Musculoskeletal: Negative.    Skin: Negative.    Neurological: Negative.    Hematological: Negative.    Psychiatric/Behavioral: Negative.      Objective:     Vital Signs (Most Recent):  Temp: 97.4 °F (36.3 °C) (05/18/18 1130)  Pulse: 106 (05/18/18 1606)  Resp: 18 (05/18/18 1606)  BP: 135/83 (05/18/18 1130)  SpO2: 99 % (05/18/18 1606) Vital Signs (24h  Range):  Temp:  [97.4 °F (36.3 °C)-98.3 °F (36.8 °C)] 97.4 °F (36.3 °C)  Pulse:  [] 106  Resp:  [18-20] 18  SpO2:  [97 %-100 %] 99 %  BP: (108-166)/(64-97) 135/83     Weight: 51.5 kg (113 lb 8.6 oz)  Body mass index is 20.11 kg/m².    Intake/Output Summary (Last 24 hours) at 05/18/18 1615  Last data filed at 05/18/18 1138   Gross per 24 hour   Intake              500 ml   Output              500 ml   Net                0 ml      Physical Exam   Constitutional: He is oriented to person, place, and time. No distress.   Thin man   HENT:   Head: Normocephalic and atraumatic.   Nose: Nose normal.   Mouth/Throat: Oropharynx is clear and moist.   Eyes: Conjunctivae and EOM are normal. Pupils are equal, round, and reactive to light. No scleral icterus.   Neck: Neck supple. No JVD present. No tracheal deviation present. No thyromegaly present.   Cardiovascular: Normal rate, regular rhythm, normal heart sounds and intact distal pulses.  Exam reveals no gallop and no friction rub.    No murmur heard.  Pulmonary/Chest: Effort normal. No stridor. No respiratory distress. He has wheezes (throughout, less). He has no rales. He exhibits no tenderness.   At room air   Abdominal: Soft. Bowel sounds are normal. He exhibits no distension and no mass. There is no tenderness. There is no guarding.   Musculoskeletal: He exhibits no edema or tenderness.   Lymphadenopathy:     He has no cervical adenopathy.   Neurological: He is alert and oriented to person, place, and time. No cranial nerve deficit or sensory deficit.   Skin: Skin is warm and dry. He is not diaphoretic.   Scabs on bilateral lower extremities   Nursing note and vitals reviewed.      Significant Labs: All pertinent labs within the past 24 hours have been reviewed.    Significant Imaging: I have reviewed all pertinent imaging results/findings within the past 24 hours.  I have reviewed and interpreted all pertinent imaging results/findings within the past 24  "hours.    Assessment/Plan:      * Acute respiratory failure with hypoxia and hypercapnia    - secondary to COPD exacerbation. No evidence of chronic CO2 retention  - weaned off NC and stable, although still wheezing  - LABA/ICS, continue tiotropium, duo-nebs and supp as needed for goal SaO2 > 92%. Stop steroids today.           COPD exacerbation    - presented with hypoxic and hypercapnic respiratory failure. Patient has background of chronic lung changes due to Hodgkin's lymphoma with CXR showing scarring in R lung. Patient reports weather change as cause of acute exacerbation. Not on O2 at home  - management as above          DREW (acute kidney injury)    - improved with fluids, suggesting pre-renal etiology, however Cr back up again but no longer with hyperkalemia. Will give more fluids today and recheck renal panel in AM  - renal dose all meds, avoid nephrotoxins           Alcohol use disorder, moderate, dependence    Although patient denied this to me at first, I again addressed it after I saw reports from [facility] about multiple admissions to [facility] for alcohol related medical issues, in addition to COPD exacerbation and poorly controlled diabetes. Has no symptoms of withdrawal currently. Strongly advised cessation, especially with underlying COPD and diabetes.           Malnutrition of moderate degree    - as evidenced by loss of subQ fat and muscle wasting as well as falls at home. Patient also on cyproheptadine as outpatient   - increase diet to 2000 samm          Leg cramping    Neuropathic pain 2/2 diabetes. Restart pregabalin        Type 2 diabetes mellitus with hyperglycemia, without long-term current use of insulin    -  A1c 11.1%. On metformin and "as needed" novolog  - presented with hyperglycemia, improved rapidly with IVF  - ADA diet, accuchecks ACHS, SSI, hypoglycemic protocol  - adjust insulin for goal BG < 180. Currently not at goal  - will need prescription for long acting and short acting flex " pen.          Anemia    - presents with Hgb 11, normal MCV  - likely anemia of chronic disease  - monitor             VTE Risk Mitigation         Ordered     enoxaparin injection 40 mg  Daily      05/16/18 1620     IP VTE LOW RISK PATIENT  Once      05/15/18 0655     Place sequential compression device  Until discontinued      05/15/18 0655              Tarsha Long MD  Department of Hospital Medicine   [facility]

## 2018-05-18 NOTE — SUBJECTIVE & OBJECTIVE
Interval History: stable from breathing standpoint although still wheezing. BG still very high despite increasing insulin dose. Patient denied eating between meals. Stated accuchecks are prior to meals.     Review of Systems   Constitutional: Negative.    HENT: Negative.    Eyes: Negative.    Respiratory: Positive for wheezing. Negative for shortness of breath.    Cardiovascular: Negative.    Gastrointestinal: Negative.    Genitourinary: Negative.    Musculoskeletal: Negative.    Skin: Negative.    Neurological: Negative.    Hematological: Negative.    Psychiatric/Behavioral: Negative.      Objective:     Vital Signs (Most Recent):  Temp: 97.4 °F (36.3 °C) (05/18/18 1130)  Pulse: 106 (05/18/18 1606)  Resp: 18 (05/18/18 1606)  BP: 135/83 (05/18/18 1130)  SpO2: 99 % (05/18/18 1606) Vital Signs (24h Range):  Temp:  [97.4 °F (36.3 °C)-98.3 °F (36.8 °C)] 97.4 °F (36.3 °C)  Pulse:  [] 106  Resp:  [18-20] 18  SpO2:  [97 %-100 %] 99 %  BP: (108-166)/(64-97) 135/83     Weight: 51.5 kg (113 lb 8.6 oz)  Body mass index is 20.11 kg/m².    Intake/Output Summary (Last 24 hours) at 05/18/18 1615  Last data filed at 05/18/18 1138   Gross per 24 hour   Intake              500 ml   Output              500 ml   Net                0 ml      Physical Exam   Constitutional: He is oriented to person, place, and time. No distress.   Thin man   HENT:   Head: Normocephalic and atraumatic.   Nose: Nose normal.   Mouth/Throat: Oropharynx is clear and moist.   Eyes: Conjunctivae and EOM are normal. Pupils are equal, round, and reactive to light. No scleral icterus.   Neck: Neck supple. No JVD present. No tracheal deviation present. No thyromegaly present.   Cardiovascular: Normal rate, regular rhythm, normal heart sounds and intact distal pulses.  Exam reveals no gallop and no friction rub.    No murmur heard.  Pulmonary/Chest: Effort normal. No stridor. No respiratory distress. He has wheezes (throughout, less). He has no rales. He  exhibits no tenderness.   At room air   Abdominal: Soft. Bowel sounds are normal. He exhibits no distension and no mass. There is no tenderness. There is no guarding.   Musculoskeletal: He exhibits no edema or tenderness.   Lymphadenopathy:     He has no cervical adenopathy.   Neurological: He is alert and oriented to person, place, and time. No cranial nerve deficit or sensory deficit.   Skin: Skin is warm and dry. He is not diaphoretic.   Scabs on bilateral lower extremities   Nursing note and vitals reviewed.      Significant Labs: All pertinent labs within the past 24 hours have been reviewed.    Significant Imaging: I have reviewed all pertinent imaging results/findings within the past 24 hours.  I have reviewed and interpreted all pertinent imaging results/findings within the past 24 hours.

## 2018-05-18 NOTE — ASSESSMENT & PLAN NOTE
"-  A1c 11.1%. On metformin and "as needed" novolog  - presented with hyperglycemia, improved rapidly with IVF  - ADA diet, accuchecks ACHS, SSI, hypoglycemic protocol  - adjust insulin for goal BG < 180. Currently not at goal  - will need prescription for long acting and short acting flex pen.    "

## 2018-05-18 NOTE — ASSESSMENT & PLAN NOTE
- secondary to COPD exacerbation. No evidence of chronic CO2 retention  - weaned off NC and stable, although still wheezing  - LABA/ICS, continue tiotropium, duo-nebs and supp as needed for goal SaO2 > 92%. Stop steroids today.

## 2018-05-18 NOTE — PROGRESS NOTES
[facility]    WRITTEN HEALTHCARE AND DISCHARGE INFORMATION     Follow-up Information     Finn Prado MD On 5/21/2018.    Specialty:  Internal Medicine  Why:  Monday at 2:45PM  Contact information:  [address] [place] [address] [address]  [address] [place] [address]  [phone]             [facility] .    Contact information:  Someone from [facility] will be following up with you after discharge                                      Help at Home           [phone]  After discharge for assistance [facility] On Call Nurse Care Line 24/7  Assistance    Things You are responsible For To Manage Your Care At Home:  1.    Getting your prescriptions filled   2.    Taking your medications as directed, DO NOT MISS ANY DOSES!  3.    Going to your follow-up doctor appointment. This is important because it  allow the doctor to monitor your progress and determine if  any changes need to made to your treatment plan.     Thank you for choosing [facility] for your care.  Please answer any calls you may receive from [facility] we want to continue to support you as you manage your healthcare needs. [facility] happy to have the opportunity to serve you.     Sincerely,  Your [facility] Team,  KAYE Carcamo, RN;  [phone]

## 2018-05-18 NOTE — ASSESSMENT & PLAN NOTE
Although patient denied this to me at first, I again addressed it after I saw reports from [facility] about multiple admissions to [facility] for alcohol related medical issues, in addition to COPD exacerbation and poorly controlled diabetes. Has no symptoms of withdrawal currently. Strongly advised cessation, especially with underlying COPD and diabetes.

## 2018-05-18 NOTE — PLAN OF CARE
05/18/18 1154   Discharge Reassessment   Assessment Type Discharge Planning Reassessment   Introduced myself to patient and notified them that I would be reviewing the follow up appointments and educating them on signs and symptoms of DKA and ways that they can manage the patient's care at home.  AIDET technique used.  Reviewed follow up appointments and identified where the phone number was located for the 24/7 [facility] on call nurse as another resource for patient.      I provided written discharge education and had patient perform teachback with 2 things that they have learned. Patient verbalized that SS of DKA are increased thirst and peeing     DC follow up and education placed in blue DC folder at bedside     Provided patient written printed information on homeless shelters and nursing homes.

## 2018-05-18 NOTE — PLAN OF CARE
Problem: Diabetes, Type 2 (Adult)  Intervention: Support/Optimize Psychosocial Response to Condition   05/17/18 2000   Coping/Psychosocial Interventions   Supportive Measures active listening utilized;verbalization of feelings encouraged   Environmental Support calm environment promoted;environmental consistency promoted     Intervention: Optimize Glycemic Control   05/17/18 2014   Nutrition Interventions   Glycemic Management blood glucose monitoring;supplemental insulin given         Comments: Blood sugar better, moving in the direction of normal. Covered with 1 Unit of Aspart during night. Snakc provided. Slept well with PRN insomnia med. No c/o pain or SOB. ST noted on telemetry monitor when OOB. Encouraged to rest during night time hours. Call light and urinal at side.

## 2018-05-18 NOTE — ASSESSMENT & PLAN NOTE
- improved with fluids, suggesting pre-renal etiology, however Cr back up again but no longer with hyperkalemia. Will give more fluids today and recheck renal panel in AM  - renal dose all meds, avoid nephrotoxins

## 2018-05-19 VITALS
RESPIRATION RATE: 18 BRPM | OXYGEN SATURATION: 97 % | WEIGHT: 112.19 LBS | HEART RATE: 82 BPM | DIASTOLIC BLOOD PRESSURE: 83 MMHG | BODY MASS INDEX: 19.88 KG/M2 | TEMPERATURE: 98 F | HEIGHT: 63 IN | SYSTOLIC BLOOD PRESSURE: 148 MMHG

## 2018-05-19 LAB
ALBUMIN SERPL BCP-MCNC: 2.6 G/DL
ANION GAP SERPL CALC-SCNC: 8 MMOL/L
BUN SERPL-MCNC: 26 MG/DL
CALCIUM SERPL-MCNC: 8.8 MG/DL
CHLORIDE SERPL-SCNC: 105 MMOL/L
CO2 SERPL-SCNC: 26 MMOL/L
CREAT SERPL-MCNC: 1.3 MG/DL
EST. GFR  (AFRICAN AMERICAN): >60 ML/MIN/1.73 M^2
EST. GFR  (NON AFRICAN AMERICAN): >60 ML/MIN/1.73 M^2
GLUCOSE SERPL-MCNC: 125 MG/DL
PHOSPHATE SERPL-MCNC: 3.4 MG/DL
POCT GLUCOSE: 157 MG/DL (ref 70–110)
POCT GLUCOSE: 47 MG/DL (ref 70–110)
POCT GLUCOSE: 80 MG/DL (ref 70–110)
POTASSIUM SERPL-SCNC: 4 MMOL/L
SODIUM SERPL-SCNC: 139 MMOL/L

## 2018-05-19 PROCEDURE — 25000242 PHARM REV CODE 250 ALT 637 W/ HCPCS: Performed by: EMERGENCY MEDICINE

## 2018-05-19 PROCEDURE — 36415 COLL VENOUS BLD VENIPUNCTURE: CPT

## 2018-05-19 PROCEDURE — 94640 AIRWAY INHALATION TREATMENT: CPT

## 2018-05-19 PROCEDURE — 25000003 PHARM REV CODE 250: Performed by: INTERNAL MEDICINE

## 2018-05-19 PROCEDURE — 25000003 PHARM REV CODE 250: Performed by: HOSPITALIST

## 2018-05-19 PROCEDURE — 94761 N-INVAS EAR/PLS OXIMETRY MLT: CPT

## 2018-05-19 PROCEDURE — 80069 RENAL FUNCTION PANEL: CPT

## 2018-05-19 RX ORDER — INSULIN ASPART 100 [IU]/ML
8 INJECTION, SOLUTION INTRAVENOUS; SUBCUTANEOUS
Qty: 7.2 ML | Refills: 11 | Status: SHIPPED | OUTPATIENT
Start: 2018-05-19 | End: 2019-05-19

## 2018-05-19 RX ORDER — TIOTROPIUM BROMIDE 18 UG/1
1 CAPSULE ORAL; RESPIRATORY (INHALATION) DAILY
Qty: 30 CAPSULE | Refills: 11 | Status: SHIPPED | OUTPATIENT
Start: 2018-05-19 | End: 2018-05-19 | Stop reason: HOSPADM

## 2018-05-19 RX ORDER — INSULIN ASPART 100 [IU]/ML
8 INJECTION, SOLUTION INTRAVENOUS; SUBCUTANEOUS
Qty: 7.2 ML | Refills: 11 | Status: SHIPPED | OUTPATIENT
Start: 2018-05-19 | End: 2018-05-19

## 2018-05-19 RX ADMIN — PREGABALIN 150 MG: 50 CAPSULE ORAL at 09:05

## 2018-05-19 RX ADMIN — TIOTROPIUM BROMIDE 18 MCG: 18 CAPSULE ORAL; RESPIRATORY (INHALATION) at 07:05

## 2018-05-19 RX ADMIN — INSULIN ASPART 10 UNITS: 100 INJECTION, SOLUTION INTRAVENOUS; SUBCUTANEOUS at 09:05

## 2018-05-19 RX ADMIN — FLUTICASONE FUROATE AND VILANTEROL TRIFENATATE 1 PUFF: 100; 25 POWDER RESPIRATORY (INHALATION) at 07:05

## 2018-05-19 RX ADMIN — IPRATROPIUM BROMIDE AND ALBUTEROL SULFATE 3 ML: .5; 2.5 SOLUTION RESPIRATORY (INHALATION) at 07:05

## 2018-05-19 RX ADMIN — CYPROHEPTADINE HYDROCHLORIDE 4 MG: 4 TABLET ORAL at 09:05

## 2018-05-19 RX ADMIN — IPRATROPIUM BROMIDE AND ALBUTEROL SULFATE 3 ML: .5; 2.5 SOLUTION RESPIRATORY (INHALATION) at 03:05

## 2018-05-19 NOTE — PLAN OF CARE
05/19/18 1152   Final Note   Assessment Type Final Discharge Note   Discharge Disposition Home   What phone number can be called within the next 1-3 days to see how you are doing after discharge? ([phone] )   Hospital Follow Up  Appt(s) scheduled? Yes   Discharge plans and expectations educations in teach back method with documentation complete? Yes   Right Care Referral Info   Post Acute Recommendation No Care

## 2018-05-19 NOTE — PLAN OF CARE
Problem: Diabetes, Type 2 (Adult)  Intervention: Support/Optimize Psychosocial Response to Condition   05/18/18 2000   Coping/Psychosocial Interventions   Supportive Measures active listening utilized;verbalization of feelings encouraged   Environmental Support calm environment promoted;environmental consistency promoted     Intervention: Optimize Glycemic Control   05/18/18 2000   Nutrition Interventions   Glycemic Management blood glucose monitoring;carbohydrate replacement provided;oral hydration promoted         Comments: Blood sugar in the 60's. Consumed a complete snack box (sandwhich, cookies, and apple) with 240 mL of juice. Excellent appetite. Admitted to being SOB with activity but still refused to wear the Bipap at night. Call light at side. PRN insomnia med effective.

## 2018-05-19 NOTE — NURSING
D/c instructions and prescriptions reviewed and given to pt. Pt verbalized understanding. Pt aaox4. resp even and unlabored. Vitals and blood glucose stable. Iv removed with catheter intact. Pt awaiting ride.

## 2018-05-20 LAB
BACTERIA BLD CULT: NORMAL
BACTERIA BLD CULT: NORMAL

## 2018-05-22 NOTE — DISCHARGE SUMMARY
[facility] Hospital Medicine  Discharge Summary      Patient Name: Basilio Márquez  MRN: [id number]  Admission Date: 5/14/2018  Hospital Length of Stay: 4 days  Discharge Date and Time:  05/19/2018 6:38 AM  Attending Physician: No att. providers found   Discharging Provider: Tarsha Long MD  Primary Care Provider: Finn Prado MD      HPI:   Mr. Basilio Márquez is a 57 y.o. 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 States this started gradually a few days ago with the weather change. Associated with chest congestion and cough with no sputum production. No fevers or chills. Chest pain only with coughing. No sick contacts. Tried his home inhalers and nebulizers with minimal improvement. Presented to ED for worsening shortness of breath.     * No surgery found *      Hospital Course:   Admitted to ICU with hypercapnic respiratory failure due to COPD exacerbation. Started on BiPAP, solumedrol, and nebs with improvement. Given ceftriaxone and azithromycin in ED; no signs of PNA, therefore discontinued. Patient later reported binge drinking often and non compliance with bronchodilators managements. Also exposed to second hand smoking. Weaned to nasal cannula on 5/15 AM and transferred to floor in stable condition. Completed 4 days of steroids. Caused significant rise in BG in setting of poorly controlled diabetes at home (HgbA1c 11.1%). Controlled with detemir 25 U QHS and novolog 8 U with meals. Discussed this regimen with patient as he was only doing novolog as needed at home. Is also to continue metformin. Eventually able to be weaned off completely from supplemental O2 and able to ambulate independently down hallways without respiratory issues. Strongly advised alcohol cessation and exposure to second hand smoke. He requested NH placement to help with medical management at home. Then decided on staying home and ok with outpatient  case management. Declined home health. Is to f/u with PCP within next 7 days. Diabetic diet. Activity as tolerated.        Final Active Diagnoses:    Diagnosis Date Noted POA    PRINCIPAL PROBLEM:  Acute respiratory failure with hypoxia and hypercapnia [J96.01, J96.02] 05/15/2018 Yes    COPD exacerbation [J44.1] 05/15/2018 Yes    DREW (acute kidney injury) [N17.9] 05/15/2018 Yes    Alcohol use disorder, moderate, dependence [F10.20] 05/18/2018 Yes    Anemia [D64.9] 05/15/2018 Yes    Type 2 diabetes mellitus with hyperglycemia, without long-term current use of insulin [E11.65] 05/15/2018 Yes    Leg cramping [R25.2] 05/15/2018 Yes    Malnutrition of moderate degree [E44.0] 05/15/2018 Yes      Problems Resolved During this Admission:    Diagnosis Date Noted Date Resolved POA       Discharged Condition: good    Disposition: Home or Self Care    Follow Up:  Follow-up Information     Finn Prado MD On 5/21/2018.    Specialty:  Internal Medicine  Why:  Monday at 2:45PM  Contact information:  [address] [place] [address] [address]  [id number]  [place] [id number]  [phone]             [facility] .    Contact information:  Someone from [facility] will be following up with you after discharge                 Medications:  Reconciled Home Medications:      Medication List      START taking these medications    insulin aspart U-100 100 unit/mL Inpn pen  Commonly known as:  NovoLOG  Inject 8 Units into the skin 3 (three) times daily with meals.     insulin detemir U-100 100 unit/mL (3 mL) Inpn pen  Commonly known as:  LEVEMIR FLEXTOUCH  Inject 25 Units into the skin every evening.     umeclidinium 62.5 mcg/actuation Dsdv  Inhale 62.5 mcg into the lungs once daily. Controller        CONTINUE taking these medications    albuterol 90 mcg/actuation inhaler  Inhale 2 puffs into the lungs every 6 (six) hours as needed for Wheezing or Shortness of Breath.     budesonide-formoterol 160-4.5 mcg 160-4.5 mcg/actuation Hfaa  Commonly known  as:  SYMBICORT  Inhale 2 puffs into the lungs every 12 (twelve) hours.     cyproheptadine 4 mg tablet  Commonly known as:  PERIACTIN  Take 1 tablet (4 mg total) by mouth 3 (three) times daily.     fluticasone 50 mcg/actuation nasal spray  Commonly known as:  FLONASE  1-2 sprays by Each Nare route once daily.     hydrOXYzine pamoate 25 MG Cap  Commonly known as:  VISTARIL  Take 1 capsule (25 mg total) by mouth nightly as needed (insomnia or anxiety).     metFORMIN 1000 MG tablet  Commonly known as:  GLUCOPHAGE  Take 1 tablet (1,000 mg total) by mouth 2 (two) times daily with meals.            Indwelling Lines/Drains at time of discharge:   Lines/Drains/Airways          No matching active lines, drains, or airways          Time spent on the discharge of patient: > 35 minutes  Patient was seen and examined on the date of discharge and determined to be suitable for discharge.         Tarsha Long MD  Department of Hospital Medicine  [facility]

## 2018-05-26 ENCOUNTER — HOSPITAL ENCOUNTER (EMERGENCY)
Facility: HOSPITAL | Age: 58
Discharge: HOME | End: 2018-05-26
Attending: EMERGENCY MEDICINE
Payer: MEDICARE

## 2018-05-26 VITALS
TEMPERATURE: 98 F | HEIGHT: 63 IN | WEIGHT: 112 LBS | HEART RATE: 96 BPM | RESPIRATION RATE: 19 BRPM | SYSTOLIC BLOOD PRESSURE: 157 MMHG | BODY MASS INDEX: 19.84 KG/M2 | DIASTOLIC BLOOD PRESSURE: 95 MMHG | OXYGEN SATURATION: 95 %

## 2018-05-26 DIAGNOSIS — J44.1 COPD EXACERBATION: Primary | ICD-10-CM

## 2018-05-26 DIAGNOSIS — F10.920 ALCOHOLIC INTOXICATION WITHOUT COMPLICATION: ICD-10-CM

## 2018-05-26 PROCEDURE — 94640 AIRWAY INHALATION TREATMENT: CPT

## 2018-05-26 PROCEDURE — 94760 N-INVAS EAR/PLS OXIMETRY 1: CPT

## 2018-05-26 PROCEDURE — 25000242 PHARM REV CODE 250 ALT 637 W/ HCPCS: Performed by: EMERGENCY MEDICINE

## 2018-05-26 PROCEDURE — 99285 EMERGENCY DEPT VISIT HI MDM: CPT

## 2018-05-26 PROCEDURE — 63600175 PHARM REV CODE 636 W HCPCS: Performed by: EMERGENCY MEDICINE

## 2018-05-26 RX ORDER — PREDNISONE 50 MG/1
50 TABLET ORAL DAILY
Qty: 10 TABLET | Refills: 0 | Status: SHIPPED | OUTPATIENT
Start: 2018-05-26 | End: 2018-05-31

## 2018-05-26 RX ORDER — PREDNISONE 20 MG/1
60 TABLET ORAL
Status: COMPLETED | OUTPATIENT
Start: 2018-05-26 | End: 2018-05-26

## 2018-05-26 RX ORDER — ALBUTEROL SULFATE 90 UG/1
1-2 AEROSOL, METERED RESPIRATORY (INHALATION) EVERY 6 HOURS PRN
Qty: 1 INHALER | Refills: 0 | Status: SHIPPED | OUTPATIENT
Start: 2018-05-26 | End: 2019-05-26

## 2018-05-26 RX ORDER — IPRATROPIUM BROMIDE AND ALBUTEROL SULFATE 2.5; .5 MG/3ML; MG/3ML
3 SOLUTION RESPIRATORY (INHALATION)
Status: COMPLETED | OUTPATIENT
Start: 2018-05-26 | End: 2018-05-26

## 2018-05-26 RX ORDER — AZITHROMYCIN 250 MG/1
250 TABLET, FILM COATED ORAL DAILY
Qty: 6 TABLET | Refills: 0 | Status: SHIPPED | OUTPATIENT
Start: 2018-05-26 | End: 2018-07-18

## 2018-05-26 RX ADMIN — PREDNISONE 60 MG: 20 TABLET ORAL at 03:05

## 2018-05-26 RX ADMIN — IPRATROPIUM BROMIDE AND ALBUTEROL SULFATE 3 ML: .5; 2.5 SOLUTION RESPIRATORY (INHALATION) at 04:05

## 2018-05-26 RX ADMIN — IPRATROPIUM BROMIDE AND ALBUTEROL SULFATE 3 ML: .5; 2.5 SOLUTION RESPIRATORY (INHALATION) at 03:05

## 2018-05-26 NOTE — ED TRIAGE NOTES
Pt sitting on [facility] unable to walk due to alcohol intoxication. Abrasions noted to scalp. Pt drowsy but able to respond to questions. Speech slurred.

## 2018-05-26 NOTE — ED PROVIDER NOTES
Encounter Date: 5/26/2018       History     Chief Complaint   Patient presents with    Alcohol Intoxication     Pt brought to ED for evaluation of alcohol intoxication.      57-year-old male history of alcoholism presents emergency department after having fallen this morning obviously intoxicated brought by EMS has some abrasions of the forehead and the back of the head patient is still intoxicated denies any pain chest pain dizziness abdominal pain diarrhea constipation          Review of patient's allergies indicates:  No Known Allergies  Past Medical History:   Diagnosis Date    Asthma     Cancer     Hodgkin's lymphoma in his 20s s/p chemotherapy    COPD (chronic obstructive pulmonary disease)     Diabetes mellitus     GERD (gastroesophageal reflux disease)     Hypertension     Renal disorder      No past surgical history on file.  Family History   Problem Relation Age of Onset    Ulcers Mother     Cancer Father         kidney     Social History   Substance Use Topics    Smoking status: Former Smoker     Packs/day: 1.00     Years: 30.00     Types: Cigarettes    Smokeless tobacco: Never Used    Alcohol use 0.0 oz/week      Comment: 1/2 pint daily     Review of Systems   Constitutional: Positive for fatigue.   HENT: Negative.    Eyes: Negative.    Respiratory: Negative.    Cardiovascular: Negative.    Gastrointestinal: Negative.    Genitourinary: Negative.    Musculoskeletal: Negative.    All other systems reviewed and are negative.      Physical Exam     Initial Vitals [05/26/18 1109]   BP Pulse Resp Temp SpO2   128/82 98 18 98.1 °F (36.7 °C) 98 %      MAP       97.33         Physical Exam    Constitutional: He appears well-developed.   HENT:   Small abrasions to the forehead and the occipital area no bleeding no crepitus no step-offs   Eyes: Pupils are equal, round, and reactive to light.   Mild nystagmus   Neck: Normal range of motion. Neck supple.   Cardiovascular: Normal rate, regular rhythm and  normal heart sounds.   Pulmonary/Chest: He has wheezes.   Abdominal: Soft. Bowel sounds are normal.   Musculoskeletal: Normal range of motion.   Neurological:   The patient follows commands appropriately no gross neurological deficits         ED Course   Procedures  Labs Reviewed - No data to display                       Attending Attestation:             Attending ED Notes:   Ct brain normal....will dc when sober... Patient given prednisone and neb treatment feels better. Patient no sober...             Clinical Impression:   The primary encounter diagnosis was COPD exacerbation. A diagnosis of Alcoholic intoxication without complication was also pertinent to this visit.    Disposition:   Disposition: Discharged  Condition: Stable                        Edvin Hoff MD  05/26/18 3596

## 2018-07-18 ENCOUNTER — HOSPITAL ENCOUNTER (EMERGENCY)
Facility: HOSPITAL | Age: 58
Discharge: HOME | End: 2018-07-18
Attending: EMERGENCY MEDICINE
Payer: MEDICARE

## 2018-07-18 VITALS
TEMPERATURE: 98 F | RESPIRATION RATE: 18 BRPM | OXYGEN SATURATION: 95 % | WEIGHT: 117 LBS | SYSTOLIC BLOOD PRESSURE: 126 MMHG | DIASTOLIC BLOOD PRESSURE: 72 MMHG | HEIGHT: 62 IN | BODY MASS INDEX: 21.53 KG/M2 | HEART RATE: 87 BPM

## 2018-07-18 DIAGNOSIS — F10.930 ALCOHOL WITHDRAWAL SYNDROME WITHOUT COMPLICATION: Primary | ICD-10-CM

## 2018-07-18 DIAGNOSIS — E16.2 HYPOGLYCEMIA: ICD-10-CM

## 2018-07-18 LAB
ANION GAP SERPL CALC-SCNC: 16 MMOL/L
BASOPHILS # BLD AUTO: 0.02 K/UL
BASOPHILS NFR BLD: 0.4 %
BUN SERPL-MCNC: 9 MG/DL
CALCIUM SERPL-MCNC: 8.5 MG/DL
CHLORIDE SERPL-SCNC: 110 MMOL/L
CO2 SERPL-SCNC: 18 MMOL/L
CREAT SERPL-MCNC: 1.3 MG/DL
DIFFERENTIAL METHOD: ABNORMAL
EOSINOPHIL # BLD AUTO: 0 K/UL
EOSINOPHIL NFR BLD: 0.8 %
ERYTHROCYTE [DISTWIDTH] IN BLOOD BY AUTOMATED COUNT: 17.7 %
EST. GFR  (AFRICAN AMERICAN): >60 ML/MIN/1.73 M^2
EST. GFR  (NON AFRICAN AMERICAN): >60 ML/MIN/1.73 M^2
ETHANOL SERPL-MCNC: 68 MG/DL
GLUCOSE SERPL-MCNC: 164 MG/DL
HCT VFR BLD AUTO: 36.6 %
HGB BLD-MCNC: 11.7 G/DL
LYMPHOCYTES # BLD AUTO: 0.7 K/UL
LYMPHOCYTES NFR BLD: 13.9 %
MCH RBC QN AUTO: 29 PG
MCHC RBC AUTO-ENTMCNC: 32 G/DL
MCV RBC AUTO: 91 FL
MONOCYTES # BLD AUTO: 0.4 K/UL
MONOCYTES NFR BLD: 6.8 %
NEUTROPHILS # BLD AUTO: 4.1 K/UL
NEUTROPHILS NFR BLD: 77.7 %
PLATELET # BLD AUTO: 300 K/UL
PMV BLD AUTO: 8.9 FL
POCT GLUCOSE: 154 MG/DL (ref 70–110)
POCT GLUCOSE: 182 MG/DL (ref 70–110)
POTASSIUM SERPL-SCNC: 3.9 MMOL/L
RBC # BLD AUTO: 4.03 M/UL
SODIUM SERPL-SCNC: 144 MMOL/L
WBC # BLD AUTO: 5.31 K/UL

## 2018-07-18 PROCEDURE — 82962 GLUCOSE BLOOD TEST: CPT | Mod: 91

## 2018-07-18 PROCEDURE — 99285 EMERGENCY DEPT VISIT HI MDM: CPT | Mod: 25

## 2018-07-18 PROCEDURE — 25000242 PHARM REV CODE 250 ALT 637 W/ HCPCS: Performed by: EMERGENCY MEDICINE

## 2018-07-18 PROCEDURE — 94640 AIRWAY INHALATION TREATMENT: CPT

## 2018-07-18 PROCEDURE — 96375 TX/PRO/DX INJ NEW DRUG ADDON: CPT

## 2018-07-18 PROCEDURE — 96365 THER/PROPH/DIAG IV INF INIT: CPT

## 2018-07-18 PROCEDURE — 80048 BASIC METABOLIC PNL TOTAL CA: CPT

## 2018-07-18 PROCEDURE — 25000003 PHARM REV CODE 250: Performed by: EMERGENCY MEDICINE

## 2018-07-18 PROCEDURE — 80320 DRUG SCREEN QUANTALCOHOLS: CPT

## 2018-07-18 PROCEDURE — 63600175 PHARM REV CODE 636 W HCPCS: Performed by: EMERGENCY MEDICINE

## 2018-07-18 PROCEDURE — 85025 COMPLETE CBC W/AUTO DIFF WBC: CPT

## 2018-07-18 PROCEDURE — 96366 THER/PROPH/DIAG IV INF ADDON: CPT

## 2018-07-18 PROCEDURE — 96376 TX/PRO/DX INJ SAME DRUG ADON: CPT

## 2018-07-18 RX ORDER — IPRATROPIUM BROMIDE AND ALBUTEROL SULFATE 2.5; .5 MG/3ML; MG/3ML
3 SOLUTION RESPIRATORY (INHALATION)
Status: COMPLETED | OUTPATIENT
Start: 2018-07-18 | End: 2018-07-18

## 2018-07-18 RX ORDER — PREDNISONE 20 MG/1
TABLET ORAL
COMMUNITY

## 2018-07-18 RX ORDER — CETIRIZINE HYDROCHLORIDE 10 MG/1
TABLET ORAL
COMMUNITY

## 2018-07-18 RX ORDER — GABAPENTIN 600 MG/1
TABLET ORAL
COMMUNITY

## 2018-07-18 RX ORDER — CHLORDIAZEPOXIDE HYDROCHLORIDE 25 MG/1
50 CAPSULE, GELATIN COATED ORAL 4 TIMES DAILY PRN
Qty: 40 CAPSULE | Refills: 0 | Status: SHIPPED | OUTPATIENT
Start: 2018-07-18 | End: 2018-07-23

## 2018-07-18 RX ORDER — CHLORDIAZEPOXIDE HYDROCHLORIDE 25 MG/1
50 CAPSULE, GELATIN COATED ORAL ONCE
Status: COMPLETED | OUTPATIENT
Start: 2018-07-18 | End: 2018-07-18

## 2018-07-18 RX ORDER — LORAZEPAM 2 MG/ML
2 INJECTION INTRAMUSCULAR
Status: COMPLETED | OUTPATIENT
Start: 2018-07-18 | End: 2018-07-18

## 2018-07-18 RX ADMIN — LORAZEPAM 2 MG: 2 INJECTION INTRAMUSCULAR; INTRAVENOUS at 09:07

## 2018-07-18 RX ADMIN — LORAZEPAM 2 MG: 2 INJECTION INTRAMUSCULAR; INTRAVENOUS at 12:07

## 2018-07-18 RX ADMIN — FOLIC ACID 200 ML/HR: 5 INJECTION, SOLUTION INTRAMUSCULAR; INTRAVENOUS; SUBCUTANEOUS at 07:07

## 2018-07-18 RX ADMIN — IPRATROPIUM BROMIDE AND ALBUTEROL SULFATE 3 ML: .5; 2.5 SOLUTION RESPIRATORY (INHALATION) at 08:07

## 2018-07-18 RX ADMIN — CHLORDIAZEPOXIDE HYDROCHLORIDE 50 MG: 25 CAPSULE ORAL at 12:07

## 2018-07-18 NOTE — ED TRIAGE NOTES
Pt arrived to ED due to hypoglycemia. EMS reports that pt was unconscious for 5 minutes. Slurred speech was present when he gained consciousness. Pt states that he doesn't know whether or not he hit his head. EMS states that his sugar was originally 36 when they first checked it. Pt has no other complaints as of now.

## 2018-07-18 NOTE — PROGRESS NOTES
SW advised by nurse that patient didn't have any place to go at discharge.  SW f/u with patient to advise [place].  [place] stated that [place] but just needed transportation to get back to [facility].  SW advised patient that we needed an address we could not just have him dropped off on [facility].  Patient stated that he could go to [address] [place] [address] [place] [address].  SW verified patient's demographics and asked if he emergency , Flavia Barreto, would be able to come to hospital to pick him up after discharge.  He stated that she probably would not come to pick him up because she was at work and works late.  Stated that his friend works in the [place].  Will assist patient with cab transportation  to desired address, if approved by ED Charge or Hse [facility].  Melissa Fernandez LMSW, [facility], [facility]  7/18/2018

## 2018-07-18 NOTE — ED PROVIDER NOTES
Encounter Date: 7/18/2018    SCRIBE #1 NOTE: I, Ileana George, am scribing for, and in the presence of,  Devante Saavedra MD. I have scribed the following portions of the note - Other sections scribed: HPI/ROS/PE.       History     Chief Complaint   Patient presents with    Hypoglycemia     Pt was found down but awake and disoriented, slurred speech, upon CBG check per EMS CBG was 36, recieved 1 amp D50, pt is now alert and oriented     CC: Hypoglycemia  Patient arrived via EMS    HPI: This 58 y.o male who has Asthma, COPD, Diabetes mellitus,  Hypertension, Renal disorder and a past medical history of Hogdkin's lymphoma presents to the ED for an evaluation for hypoglycemia.  Patient reports EMS was called by his friend after his friend noticed him to have activity changes.  EMS reports the patient with an initial CBG 36.  Patient reports having difficulty speaking and difficulty moving.  Patient reports his friend initially assumed his actions were a result of possible alcohol intoxication.  Patient reports his last drink of alcohol being last night.  Patient reports he currently treats his diabetes with insulin.  He reports he did not eat prior to bedtime.  Patient denies fever, chills, nausea, emesis, diarrhea, abdominal pain, chest pain, back pain, shortness of breath, rash, or any other associated symptoms.  Patient was administered 1 amp of D50 while en route with EMS.  No prior treatment at home.  No exacerbating factors. Patient drinks a pint of bourbon daily.      The history is provided by the patient. No  was used.     Review of patient's allergies indicates:  No Known Allergies  Past Medical History:   Diagnosis Date    Asthma     Cancer     Hodgkin's lymphoma in his 20s s/p chemotherapy    COPD (chronic obstructive pulmonary disease)     Diabetes mellitus     GERD (gastroesophageal reflux disease)     Hypertension     Renal disorder      History reviewed. No pertinent surgical  history.  Family History   Problem Relation Age of Onset    Ulcers Mother     Cancer Father         kidney     Social History   Substance Use Topics    Smoking status: Former Smoker     Packs/day: 1.00     Years: 30.00     Types: Cigarettes    Smokeless tobacco: Never Used    Alcohol use 0.0 oz/week      Comment: 1/2 pint daily     Review of Systems   Constitutional: Positive for activity change. Negative for appetite change, chills and fever.   HENT: Negative for congestion, ear pain, rhinorrhea, sore throat and trouble swallowing.    Eyes: Negative for pain.   Respiratory: Negative for cough and shortness of breath.    Cardiovascular: Negative for chest pain.   Gastrointestinal: Negative for abdominal pain, blood in stool, constipation, diarrhea, nausea and vomiting.   Genitourinary: Negative for dysuria.   Musculoskeletal: Negative for back pain and neck pain.        (-) arm or leg problems   Skin: Negative for rash.   Neurological: Negative for dizziness, weakness, light-headedness, numbness and headaches.   All other systems reviewed and are negative.      Physical Exam     Initial Vitals [07/18/18 0724]   BP Pulse Resp Temp SpO2   136/79 90 16 98.1 °F (36.7 °C) 97 %      MAP       --         Physical Exam    Vitals reviewed.  Constitutional: He appears well-developed.   HENT:   Head: Normocephalic and atraumatic.   Mouth/Throat: Oropharynx is clear and moist. No oropharyngeal exudate.   Poor Dentition.    Eyes: Conjunctivae and EOM are normal. Pupils are equal, round, and reactive to light.   Neck: Normal range of motion. Neck supple.   Cardiovascular: Normal rate, regular rhythm and normal heart sounds. Exam reveals no gallop and no friction rub.    No murmur heard.  Pulmonary/Chest: No respiratory distress. He has wheezes (bilateral; expiratory). He has no rhonchi. He has no rales.   Abdominal: Soft. Bowel sounds are normal. He exhibits no mass. There is no tenderness. There is no rebound and no  guarding.   Musculoskeletal: Normal range of motion. He exhibits no edema or tenderness.   Neurological: He is alert and oriented to person, place, and time. He has normal reflexes. He displays tremor. No cranial nerve deficit or sensory deficit. GCS eye subscore is 4. GCS verbal subscore is 5. GCS motor subscore is 6.   No focal neurological deficits.   Skin: Skin is warm and dry. No rash noted. No erythema.   Psychiatric: He has a normal mood and affect.         ED Course   Procedures  Labs Reviewed   CBC W/ AUTO DIFFERENTIAL - Abnormal; Notable for the following:        Result Value    RBC 4.03 (*)     Hemoglobin 11.7 (*)     Hematocrit 36.6 (*)     RDW 17.7 (*)     MPV 8.9 (*)     Lymph # 0.7 (*)     Gran% 77.7 (*)     Lymph% 13.9 (*)     All other components within normal limits   BASIC METABOLIC PANEL - Abnormal; Notable for the following:     CO2 18 (*)     Glucose 164 (*)     Calcium 8.5 (*)     All other components within normal limits   ALCOHOL,MEDICAL (ETHANOL) - Abnormal; Notable for the following:     Alcohol, Medical, Serum 68 (*)     All other components within normal limits   POCT GLUCOSE - Abnormal; Notable for the following:     POCT Glucose 182 (*)     All other components within normal limits   ALCOHOL,MEDICAL (ETHANOL)   POCT GLUCOSE MONITORING CONTINUOUS          Imaging Results    None          Medical Decision Making:   Clinical Tests:   Lab Tests: Reviewed       <> Summary of Lab: Results for CORRINE PATTERSON (MRN [id number]) as of 7/18/2018 09:39    7/18/2018 07:35  WBC: 5.31  RBC: 4.03 (L)  Hemoglobin: 11.7 (L)  Hematocrit: 36.6 (L)  MCV: 91  MCH: 29.0  MCHC: 32.0  RDW: 17.7 (H)  Platelets: 300  MPV: 8.9 (L)  Gran%: 77.7 (H)  Gran # (ANC): 4.1  Lymph%: 13.9 (L)  Lymph #: 0.7 (L)  Mono%: 6.8  Mono #: 0.4  Eosinophil%: 0.8  Eos #: 0.0  Basophil%: 0.4  Baso #: 0.02  Sodium: 144  Potassium: 3.9  Chloride: 110  CO2: 18 (L)  Anion Gap: 16  BUN, Bld: 9  Creatinine: 1.3  eGFR if non :  >60  eGFR if African American: >60  Glucose: 164 (H)  Calcium: 8.5 (L)  Alcohol, Medical, Serum: 68 (H)    ED Management:  0943h tremulous but stable.  1013h Dr. Kayode Kilgore no point in admitting this patient if he will continue drinking, if he responds well to ativan he can be discharged on benzos.  1123h Re-evaluation after the ativan and patient is still tremulous. He said he wants to stop drinking ETOH.            Scribe Attestation:   Scribe #1: I performed the above scribed service and the documentation accurately describes the services I performed. I attest to the accuracy of the note.    Attending Attestation:           Physician Attestation for Scribe:  Physician Attestation Statement for Scribe #1: I, Devante Saavedra MD, reviewed documentation, as scribed by Ileana George in my presence, and it is both accurate and complete.                    Clinical Impression:   The primary encounter diagnosis was Alcohol withdrawal syndrome without complication. A diagnosis of Hypoglycemia was also pertinent to this visit.      Disposition:   Disposition: Discharged  Condition: Stable                        Devante Saavedra MD  07/20/18 5213

## 2018-07-18 NOTE — ED NOTES
"Patient stated "I have no money and I'm homeless so I need a ride when I'm discharged". Notify  for assistance.  "

## 2018-07-18 NOTE — ED NOTES
Patient C/O dizziness upon standing to ambulate to the bathroom. Dr Saavedra notified. No new orders rec. Patient stated that he has medications in his bag for the shakes. Patient was informed that he in unable to take his own medications here at hospital. Patient v/u